# Patient Record
Sex: MALE | Race: WHITE | NOT HISPANIC OR LATINO | URBAN - METROPOLITAN AREA
[De-identification: names, ages, dates, MRNs, and addresses within clinical notes are randomized per-mention and may not be internally consistent; named-entity substitution may affect disease eponyms.]

---

## 2017-05-11 ENCOUNTER — APPOINTMENT (RX ONLY)
Dept: URBAN - METROPOLITAN AREA CLINIC 349 | Facility: CLINIC | Age: 27
Setting detail: DERMATOLOGY
End: 2017-05-11

## 2017-05-11 DIAGNOSIS — D22 MELANOCYTIC NEVI: ICD-10-CM

## 2017-05-11 PROBLEM — D22.9 MELANOCYTIC NEVI, UNSPECIFIED: Status: ACTIVE | Noted: 2017-05-11

## 2017-05-11 PROBLEM — D22.4 MELANOCYTIC NEVI OF SCALP AND NECK: Status: ACTIVE | Noted: 2017-05-11

## 2017-05-11 PROBLEM — D22.39 MELANOCYTIC NEVI OF OTHER PARTS OF FACE: Status: ACTIVE | Noted: 2017-05-11

## 2017-05-11 PROCEDURE — 11100: CPT

## 2017-05-11 PROCEDURE — ? COUNSELING

## 2017-05-11 PROCEDURE — A4550 SURGICAL TRAYS: HCPCS

## 2017-05-11 PROCEDURE — 88305 TISSUE EXAM BY PATHOLOGIST: CPT

## 2017-05-11 PROCEDURE — ? PATHOLOGY BILLING

## 2017-05-11 PROCEDURE — 99202 OFFICE O/P NEW SF 15 MIN: CPT | Mod: 25

## 2017-05-11 PROCEDURE — ? BIOPSY BY SHAVE METHOD

## 2017-05-11 ASSESSMENT — LOCATION SIMPLE DESCRIPTION DERM
LOCATION SIMPLE: NECK
LOCATION SIMPLE: NECK
LOCATION SIMPLE: RIGHT FOREHEAD

## 2017-05-11 ASSESSMENT — LOCATION ZONE DERM
LOCATION ZONE: FACE
LOCATION ZONE: NECK
LOCATION ZONE: NECK

## 2017-05-11 ASSESSMENT — LOCATION DETAILED DESCRIPTION DERM
LOCATION DETAILED: LEFT SUPERIOR POSTERIOR NECK
LOCATION DETAILED: RIGHT INFERIOR MEDIAL FOREHEAD
LOCATION DETAILED: LEFT SUPERIOR POSTERIOR NECK

## 2017-05-11 NOTE — PROCEDURE: BIOPSY BY SHAVE METHOD
Bill For Surgical Tray: yes
Consent: Written consent was obtained and risks were reviewed including but not limited to scarring, infection, bleeding, scabbing, incomplete removal, nerve damage and allergy to anesthesia.
Wound Care: Vaseline
Destruction After The Procedure: No
Biopsy Type: H and E
Notification Instructions: Patient will be notified of biopsy results. However, patient instructed to call the office if not contacted within 2 weeks.
Electrodesiccation And Curettage Text: The wound bed was treated with electrodesiccation and curettage after the biopsy was performed.
Silver Nitrate Text: The wound bed was treated with silver nitrate after the biopsy was performed.
Size Of Lesion In Cm: 0.6
Electrodesiccation Text: The wound bed was treated with electrodesiccation after the biopsy was performed.
Type Of Destruction Used: Electrodesiccation
Dressing: bandage
Anesthesia Volume In Cc (Will Not Render If 0): 0.4
Hemostasis: Drysol
X Size Of Lesion In Cm: 0.7
Additional Anesthesia Volume In Cc (Will Not Render If 0): 0
Billing Type: Third-Party Bill
Anesthesia Type: 1% lidocaine with epinephrine
Post-Care Instructions: I reviewed with the patient in detail post-care instructions. Patient is to keep the biopsy site dry overnight, and then apply bacitracin twice daily until healed. Patient may apply hydrogen peroxide soaks to remove any crusting.  After the procedure, the patient was observed for 5-10 minutes and was oriented to,person, place and time and denied feeling dizzy, queasy and stated that they were not going to faint
Biopsy Method: Dermablade
Accession #: [id number]
Cryotherapy Text: The wound bed was treated with cryotherapy after the biopsy was performed.
Detail Level: Detailed

## 2017-05-30 ENCOUNTER — APPOINTMENT (RX ONLY)
Dept: URBAN - METROPOLITAN AREA CLINIC 349 | Facility: CLINIC | Age: 27
Setting detail: DERMATOLOGY
End: 2017-05-30

## 2017-05-30 DIAGNOSIS — D22 MELANOCYTIC NEVI: ICD-10-CM

## 2017-05-30 PROBLEM — D22.62 MELANOCYTIC NEVI OF LEFT UPPER LIMB, INCLUDING SHOULDER: Status: ACTIVE | Noted: 2017-05-30

## 2017-05-30 PROBLEM — D22.39 MELANOCYTIC NEVI OF OTHER PARTS OF FACE: Status: ACTIVE | Noted: 2017-05-30

## 2017-05-30 PROCEDURE — 99213 OFFICE O/P EST LOW 20 MIN: CPT

## 2017-05-30 PROCEDURE — ? COUNSELING

## 2017-05-30 ASSESSMENT — LOCATION DETAILED DESCRIPTION DERM
LOCATION DETAILED: INFERIOR MID FOREHEAD
LOCATION DETAILED: LEFT ANTERIOR PROXIMAL UPPER ARM

## 2017-05-30 ASSESSMENT — LOCATION ZONE DERM
LOCATION ZONE: FACE
LOCATION ZONE: ARM

## 2017-05-30 ASSESSMENT — LOCATION SIMPLE DESCRIPTION DERM
LOCATION SIMPLE: INFERIOR FOREHEAD
LOCATION SIMPLE: LEFT UPPER ARM

## 2017-06-27 ENCOUNTER — APPOINTMENT (RX ONLY)
Dept: URBAN - METROPOLITAN AREA CLINIC 349 | Facility: CLINIC | Age: 27
Setting detail: DERMATOLOGY
End: 2017-06-27

## 2017-06-27 DIAGNOSIS — D22 MELANOCYTIC NEVI: ICD-10-CM

## 2017-06-27 PROBLEM — D22.39 MELANOCYTIC NEVI OF OTHER PARTS OF FACE: Status: ACTIVE | Noted: 2017-06-27

## 2017-06-27 PROBLEM — D22.9 MELANOCYTIC NEVI, UNSPECIFIED: Status: ACTIVE | Noted: 2017-06-27

## 2017-06-27 PROCEDURE — ? PATHOLOGY BILLING

## 2017-06-27 PROCEDURE — 99213 OFFICE O/P EST LOW 20 MIN: CPT | Mod: 25

## 2017-06-27 PROCEDURE — 88305 TISSUE EXAM BY PATHOLOGIST: CPT

## 2017-06-27 PROCEDURE — A4550 SURGICAL TRAYS: HCPCS

## 2017-06-27 PROCEDURE — ? COUNSELING

## 2017-06-27 PROCEDURE — ? SHAVE REMOVAL

## 2017-06-27 PROCEDURE — 11311 SHAVE SKIN LESION 0.6-1.0 CM: CPT

## 2017-06-27 ASSESSMENT — LOCATION SIMPLE DESCRIPTION DERM
LOCATION SIMPLE: RIGHT CHEEK
LOCATION SIMPLE: INFERIOR FOREHEAD
LOCATION SIMPLE: LEFT CHEEK

## 2017-06-27 ASSESSMENT — LOCATION DETAILED DESCRIPTION DERM
LOCATION DETAILED: INFERIOR MID FOREHEAD
LOCATION DETAILED: LEFT CENTRAL MANDIBULAR CHEEK
LOCATION DETAILED: RIGHT CENTRAL MALAR CHEEK

## 2017-06-27 ASSESSMENT — LOCATION ZONE DERM: LOCATION ZONE: FACE

## 2017-08-22 ENCOUNTER — APPOINTMENT (RX ONLY)
Dept: URBAN - METROPOLITAN AREA CLINIC 349 | Facility: CLINIC | Age: 27
Setting detail: DERMATOLOGY
End: 2017-08-22

## 2017-08-22 DIAGNOSIS — L90.5 SCAR CONDITIONS AND FIBROSIS OF SKIN: ICD-10-CM

## 2017-08-22 PROCEDURE — ? COUNSELING

## 2017-08-22 PROCEDURE — 99213 OFFICE O/P EST LOW 20 MIN: CPT

## 2017-08-22 ASSESSMENT — LOCATION SIMPLE DESCRIPTION DERM: LOCATION SIMPLE: LEFT FOREHEAD

## 2017-08-22 ASSESSMENT — LOCATION ZONE DERM: LOCATION ZONE: FACE

## 2017-08-22 ASSESSMENT — LOCATION DETAILED DESCRIPTION DERM: LOCATION DETAILED: LEFT MEDIAL FOREHEAD

## 2018-07-05 ENCOUNTER — HOSPITAL ENCOUNTER (EMERGENCY)
Age: 28
Discharge: HOME | End: 2018-07-06
Attending: EMERGENCY MEDICINE
Payer: COMMERCIAL

## 2018-07-05 DIAGNOSIS — R10.11 ABDOMINAL PAIN, RIGHT UPPER QUADRANT: Primary | ICD-10-CM

## 2018-07-05 LAB
ALBUMIN SERPL-MCNC: 4.8 G/DL (ref 3.5–5)
ALBUMIN/GLOB SERPL: 1.4 {RATIO} (ref 1.2–3.5)
ALP SERPL-CCNC: 63 U/L (ref 50–136)
ALT SERPL-CCNC: 27 U/L (ref 12–65)
ANION GAP SERPL CALC-SCNC: 8 MMOL/L (ref 7–16)
APPEARANCE UR: CLEAR
AST SERPL-CCNC: 25 U/L (ref 15–37)
BASOPHILS # BLD: 0.1 K/UL (ref 0–0.2)
BASOPHILS NFR BLD: 0 % (ref 0–2)
BILIRUB SERPL-MCNC: 0.6 MG/DL (ref 0.2–1.1)
BILIRUB UR QL: NEGATIVE
BUN SERPL-MCNC: 14 MG/DL (ref 6–23)
CALCIUM SERPL-MCNC: 9.7 MG/DL (ref 8.3–10.4)
CHLORIDE SERPL-SCNC: 104 MMOL/L (ref 98–107)
CO2 SERPL-SCNC: 30 MMOL/L (ref 21–32)
COLOR UR: YELLOW
CREAT SERPL-MCNC: 1.18 MG/DL (ref 0.8–1.5)
DIFFERENTIAL METHOD BLD: ABNORMAL
EOSINOPHIL # BLD: 0.4 K/UL (ref 0–0.8)
EOSINOPHIL NFR BLD: 3 % (ref 0.5–7.8)
ERYTHROCYTE [DISTWIDTH] IN BLOOD BY AUTOMATED COUNT: 12.4 % (ref 11.9–14.6)
GLOBULIN SER CALC-MCNC: 3.5 G/DL (ref 2.3–3.5)
GLUCOSE SERPL-MCNC: 103 MG/DL (ref 65–100)
GLUCOSE UR STRIP.AUTO-MCNC: NEGATIVE MG/DL
HCT VFR BLD AUTO: 45.3 % (ref 41.1–50.3)
HGB BLD-MCNC: 15.6 G/DL (ref 13.6–17.2)
HGB UR QL STRIP: NEGATIVE
IMM GRANULOCYTES # BLD: 0 K/UL (ref 0–0.5)
IMM GRANULOCYTES NFR BLD AUTO: 0 % (ref 0–5)
KETONES UR QL STRIP.AUTO: NEGATIVE MG/DL
LEUKOCYTE ESTERASE UR QL STRIP.AUTO: NEGATIVE
LIPASE SERPL-CCNC: 109 U/L (ref 73–393)
LYMPHOCYTES # BLD: 1.8 K/UL (ref 0.5–4.6)
LYMPHOCYTES NFR BLD: 14 % (ref 13–44)
MCH RBC QN AUTO: 32.5 PG (ref 26.1–32.9)
MCHC RBC AUTO-ENTMCNC: 34.4 G/DL (ref 31.4–35)
MCV RBC AUTO: 94.4 FL (ref 79.6–97.8)
MONOCYTES # BLD: 0.7 K/UL (ref 0.1–1.3)
MONOCYTES NFR BLD: 6 % (ref 4–12)
NEUTS SEG # BLD: 10 K/UL (ref 1.7–8.2)
NEUTS SEG NFR BLD: 77 % (ref 43–78)
NITRITE UR QL STRIP.AUTO: NEGATIVE
PH UR STRIP: 6.5 [PH] (ref 5–9)
PLATELET # BLD AUTO: 312 K/UL (ref 150–450)
PMV BLD AUTO: 9.3 FL (ref 10.8–14.1)
POTASSIUM SERPL-SCNC: 4 MMOL/L (ref 3.5–5.1)
PROT SERPL-MCNC: 8.3 G/DL (ref 6.3–8.2)
PROT UR STRIP-MCNC: NEGATIVE MG/DL
RBC # BLD AUTO: 4.8 M/UL (ref 4.23–5.67)
SODIUM SERPL-SCNC: 142 MMOL/L (ref 136–145)
SP GR UR REFRACTOMETRY: 1.02 (ref 1–1.02)
UROBILINOGEN UR QL STRIP.AUTO: 0.2 EU/DL (ref 0.2–1)
WBC # BLD AUTO: 13 K/UL (ref 4.3–11.1)

## 2018-07-05 PROCEDURE — 81003 URINALYSIS AUTO W/O SCOPE: CPT | Performed by: EMERGENCY MEDICINE

## 2018-07-05 PROCEDURE — 96361 HYDRATE IV INFUSION ADD-ON: CPT | Performed by: EMERGENCY MEDICINE

## 2018-07-05 PROCEDURE — 99284 EMERGENCY DEPT VISIT MOD MDM: CPT | Performed by: EMERGENCY MEDICINE

## 2018-07-05 PROCEDURE — 85025 COMPLETE CBC W/AUTO DIFF WBC: CPT | Performed by: EMERGENCY MEDICINE

## 2018-07-05 PROCEDURE — 96374 THER/PROPH/DIAG INJ IV PUSH: CPT | Performed by: EMERGENCY MEDICINE

## 2018-07-05 PROCEDURE — 96375 TX/PRO/DX INJ NEW DRUG ADDON: CPT | Performed by: EMERGENCY MEDICINE

## 2018-07-05 PROCEDURE — 83690 ASSAY OF LIPASE: CPT | Performed by: EMERGENCY MEDICINE

## 2018-07-05 PROCEDURE — 74011250636 HC RX REV CODE- 250/636: Performed by: EMERGENCY MEDICINE

## 2018-07-05 PROCEDURE — 80053 COMPREHEN METABOLIC PANEL: CPT | Performed by: EMERGENCY MEDICINE

## 2018-07-05 RX ORDER — KETOROLAC TROMETHAMINE 30 MG/ML
15 INJECTION, SOLUTION INTRAMUSCULAR; INTRAVENOUS
Status: COMPLETED | OUTPATIENT
Start: 2018-07-05 | End: 2018-07-05

## 2018-07-05 RX ORDER — ONDANSETRON 2 MG/ML
4 INJECTION INTRAMUSCULAR; INTRAVENOUS
Status: COMPLETED | OUTPATIENT
Start: 2018-07-05 | End: 2018-07-05

## 2018-07-05 RX ORDER — RANITIDINE 150 MG/1
150 TABLET, FILM COATED ORAL 2 TIMES DAILY
COMMUNITY
End: 2020-08-13

## 2018-07-05 RX ADMIN — ONDANSETRON 4 MG: 2 INJECTION INTRAMUSCULAR; INTRAVENOUS at 23:20

## 2018-07-05 RX ADMIN — KETOROLAC TROMETHAMINE 15 MG: 30 INJECTION, SOLUTION INTRAMUSCULAR at 23:20

## 2018-07-05 RX ADMIN — SODIUM CHLORIDE 1000 ML: 900 INJECTION, SOLUTION INTRAVENOUS at 23:19

## 2018-07-06 ENCOUNTER — APPOINTMENT (OUTPATIENT)
Dept: ULTRASOUND IMAGING | Age: 28
End: 2018-07-06
Attending: EMERGENCY MEDICINE
Payer: COMMERCIAL

## 2018-07-06 VITALS
HEIGHT: 68 IN | SYSTOLIC BLOOD PRESSURE: 138 MMHG | WEIGHT: 179.9 LBS | DIASTOLIC BLOOD PRESSURE: 79 MMHG | HEART RATE: 67 BPM | TEMPERATURE: 98.4 F | RESPIRATION RATE: 16 BRPM | OXYGEN SATURATION: 100 % | BODY MASS INDEX: 27.26 KG/M2

## 2018-07-06 RX ORDER — HYOSCYAMINE SULFATE 0.12 MG/1
0.12 TABLET SUBLINGUAL
Status: DISCONTINUED | OUTPATIENT
Start: 2018-07-06 | End: 2018-07-06 | Stop reason: HOSPADM

## 2018-07-06 RX ORDER — KETOROLAC TROMETHAMINE 30 MG/ML
15 INJECTION, SOLUTION INTRAMUSCULAR; INTRAVENOUS
Status: DISCONTINUED | OUTPATIENT
Start: 2018-07-06 | End: 2018-07-06 | Stop reason: HOSPADM

## 2018-07-06 RX ORDER — DIPHENHYDRAMINE HYDROCHLORIDE 50 MG/ML
12.5 INJECTION, SOLUTION INTRAMUSCULAR; INTRAVENOUS ONCE
Status: DISCONTINUED | OUTPATIENT
Start: 2018-07-06 | End: 2018-07-06 | Stop reason: HOSPADM

## 2018-07-06 NOTE — ED NOTES
I have reviewed discharge instructions with the patient. The patient verbalized understanding. Patient left ED via Discharge Method: ambulatory to Home with (female visitor). Opportunity for questions and clarification provided. Patient given 0 scripts. To continue your aftercare when you leave the hospital, you may receive an automated call from our care team to check in on how you are doing. This is a free service and part of our promise to provide the best care and service to meet your aftercare needs.  If you have questions, or wish to unsubscribe from this service please call [phone]. Thank you for Choosing our [facility].

## 2018-07-06 NOTE — ED PROVIDER NOTES
HPI Comments: Here with 5 days of recurring abdominal pain that is worse after eating. No history of GI issues. No history of GB or PUD. No diarrhea.stopped all meds today. Some bloating. Poorly localized at times but mainly RUQ and really minimal to lower. . Vomiting today. Slight back pain. Only meds are Chantix and Zantac. Earlier some lower back pain. No fever. Appointment in AM with PMD @1130    Patient is a 25 y.o. male presenting with abdominal pain. The history is provided by the patient and the spouse. Abdominal Pain    This is a new problem. Episode onset: 5 days. The pain is associated with vomiting and eating. The pain is located in the RUQ. The pain is moderate. Associated symptoms include vomiting. Pertinent negatives include no fever, no diarrhea, no hematochezia, no melena, no constipation, no dysuria, no frequency and no hematuria. Nothing worsens the pain. The pain is relieved by nothing. Past workup includes no CT scan, no ultrasound. His past medical history does not include PUD, gallstones, GERD, ulcerative colitis, Crohn's disease, irritable bowel syndrome, UTI, pancreatitis, diverticulitis or DM. none       Past Medical History:   Diagnosis Date    Depression     well controlled with med    Foreign body in upper extremity     heroine needle embedded in arm    IV drug abuse     heroine    Opiate dependence ([facility] [address]        History reviewed. No pertinent surgical history. Family History:   Problem Relation Age of Onset    Diabetes Mother        Social History     Social History    Marital status: SINGLE     Spouse name: N/A    Number of children: N/A    Years of education: N/A     Occupational History    Not on file.      Social History Main Topics    Smoking status: Current Every Day Smoker     Packs/day: 0.50     Years: 5.00    Smokeless tobacco: Never Used    Alcohol use No    Drug use: Yes     Special: Opiates      Comment: heroine    Sexual activity: Not on file     Other Topics Concern    Not on file     Social History Narrative         ALLERGIES: Review of patient's allergies indicates no known allergies. Review of Systems   Constitutional: Negative for fever. Respiratory: Negative. Cardiovascular: Negative. Gastrointestinal: Positive for abdominal pain and vomiting. Negative for anal bleeding, blood in stool, constipation, diarrhea, hematochezia and melena. Genitourinary: Negative for dysuria, frequency and hematuria. Musculoskeletal: Negative. Skin: Negative. All other systems reviewed and are negative. Vitals:    07/05/18 2200 07/05/18 2230 07/05/18 2330 07/06/18 0000   BP: 124/68 119/73 121/75 138/79   Pulse: 68 65 73 67   Resp: 16 20 16 16   Temp:       SpO2: 97% 98% 100% 100%   Weight:       Height:                Physical Exam   Constitutional: He appears well-developed and well-nourished. No distress. HENT:   Head: Atraumatic. Mouth/Throat: Oropharynx is clear and moist.   Eyes: Conjunctivae are normal. No scleral icterus. Neck: Neck supple. Cardiovascular: Normal rate and intact distal pulses. Pulmonary/Chest: Effort normal. No respiratory distress. He has no wheezes. Abdominal: Bowel sounds are normal. He exhibits no distension and no mass. There is no splenomegaly. There is no rigidity, no rebound, no guarding, no CVA tenderness and no tenderness at McBurney's point. No hernia. Negative Jo's  No peritoneal signs  No CVAT   Musculoskeletal: Normal range of motion. He exhibits no edema. Neurological: He is alert. He exhibits normal muscle tone. Coordination normal.   Skin: Skin is warm and dry. He is not diaphoretic. No erythema. Psychiatric: His behavior is normal. Thought content normal.   Nursing note and vitals reviewed.        MDM  Number of Diagnoses or Management Options  Abdominal pain, right upper quadrant:      Amount and/or Complexity of Data Reviewed  Clinical lab tests: reviewed and ordered  Tests in the radiology section of CPT®: ordered  Decide to obtain previous medical records or to obtain history from someone other than the patient: yes (Reviewed old records)    Risk of Complications, Morbidity, and/or Mortality  Presenting problems: moderate  Diagnostic procedures: low  Management options: moderate  General comments: Refused to stay for ultrasound due to delays  Plan to follow with PMD in AM    Patient Progress  Patient progress: stable        ED Course       Procedures  Recent Results (from the past 12 hour(s))   URINALYSIS W/ RFLX MICROSCOPIC    Collection Time: 07/05/18  7:59 PM   Result Value Ref Range    Color YELLOW      Appearance CLEAR      Specific gravity 1.025 (H) 1.001 - 1.023      pH (UA) 6.5 5.0 - 9.0      Protein NEGATIVE  NEG mg/dL    Glucose NEGATIVE  mg/dL    Ketone NEGATIVE  NEG mg/dL    Bilirubin NEGATIVE  NEG      Blood NEGATIVE  NEG      Urobilinogen 0.2 0.2 - 1.0 EU/dL    Nitrites NEGATIVE  NEG      Leukocyte Esterase NEGATIVE  NEG     CBC WITH AUTOMATED DIFF    Collection Time: 07/05/18  8:13 PM   Result Value Ref Range    WBC 13.0 (H) 4.3 - 11.1 K/uL    RBC 4.80 4.23 - 5.67 M/uL    HGB 15.6 13.6 - 17.2 g/dL    HCT 45.3 41.1 - 50.3 %    MCV 94.4 79.6 - 97.8 FL    MCH 32.5 26.1 - 32.9 PG    MCHC 34.4 31.4 - 35.0 g/dL    RDW 12.4 11.9 - 14.6 %    PLATELET 528 926 - 133 K/uL    MPV 9.3 (L) 10.8 - 14.1 FL    DF AUTOMATED      NEUTROPHILS 77 43 - 78 %    LYMPHOCYTES 14 13 - 44 %    MONOCYTES 6 4.0 - 12.0 %    EOSINOPHILS 3 0.5 - 7.8 %    BASOPHILS 0 0.0 - 2.0 %    IMMATURE GRANULOCYTES 0 0.0 - 5.0 %    ABS. NEUTROPHILS 10.0 (H) 1.7 - 8.2 K/UL    ABS. LYMPHOCYTES 1.8 0.5 - 4.6 K/UL    ABS. MONOCYTES 0.7 0.1 - 1.3 K/UL    ABS. EOSINOPHILS 0.4 0.0 - 0.8 K/UL    ABS. BASOPHILS 0.1 0.0 - 0.2 K/UL    ABS. IMM.  GRANS. 0.0 0.0 - 0.5 K/UL   METABOLIC PANEL, COMPREHENSIVE    Collection Time: 07/05/18  8:13 PM   Result Value Ref Range    Sodium 142 136 - 145 mmol/L    Potassium 4.0 3.5 - 5.1 mmol/L Chloride 104 98 - 107 mmol/L    CO2 30 21 - 32 mmol/L    Anion gap 8 7 - 16 mmol/L    Glucose 103 (H) 65 - 100 mg/dL    BUN 14 6 - 23 MG/DL    Creatinine 1.18 0.8 - 1.5 MG/DL    GFR est AA >60 >60 ml/min/1.73m2    GFR est non-AA >60 >60 ml/min/1.73m2    Calcium 9.7 8.3 - 10.4 MG/DL    Bilirubin, total 0.6 0.2 - 1.1 MG/DL    ALT (SGPT) 27 12 - 65 U/L    AST (SGOT) 25 15 - 37 U/L    Alk.  phosphatase 63 50 - 136 U/L    Protein, total 8.3 (H) 6.3 - 8.2 g/dL    Albumin 4.8 3.5 - 5.0 g/dL    Globulin 3.5 2.3 - 3.5 g/dL    A-G Ratio 1.4 1.2 - 3.5     LIPASE    Collection Time: 07/05/18  8:13 PM   Result Value Ref Range    Lipase 109 73 - 393 U/L

## 2018-07-06 NOTE — DISCHARGE INSTRUCTIONS
Abdominal Pain: Care Instructions  Your Care Instructions    Abdominal pain has many possible causes. Some aren't serious and get better on their own in a few days. Others need more testing and treatment. If your pain continues or gets worse, you need to be rechecked and may need more tests to find out what is wrong. You may need surgery to correct the problem. Don't ignore new symptoms, such as fever, nausea and vomiting, urination problems, pain that gets worse, and dizziness. These may be signs of a more serious problem. Your doctor may have recommended a follow-up visit in the next 8 to 12 hours. If you are not getting better, you may need more tests or treatment. The doctor has checked you carefully, but problems can develop later. If you notice any problems or new symptoms, get medical treatment right away. Follow-up care is a key part of your treatment and safety. Be sure to make and go to all appointments, and call your doctor if you are having problems. It's also a good idea to know your test results and keep a list of the medicines you take. How can you care for yourself at home? · Rest until you feel better. · To prevent dehydration, drink plenty of fluids, enough so that your urine is light yellow or clear like water. Choose water and other caffeine-free clear liquids until you feel better. If you have kidney, heart, or liver disease and have to limit fluids, talk with your doctor before you increase the amount of fluids you drink. · If your stomach is upset, eat mild foods, such as rice, dry toast or crackers, bananas, and applesauce. Try eating several small meals instead of two or three large ones. · Wait until 48 hours after all symptoms have gone away before you have spicy foods, alcohol, and drinks that contain caffeine. · Do not eat foods that are high in fat. · Avoid anti-inflammatory medicines such as aspirin, ibuprofen (Advil, Motrin), and naproxen (Aleve).  These can cause stomach upset. Talk to your doctor if you take daily aspirin for another health problem. When should you call for help? Call 911 anytime you think you may need emergency care. For example, call if:  ? · You passed out (lost consciousness). ? · You pass maroon or very bloody stools. ? · You vomit blood or what looks like coffee grounds. ? · You have new, severe belly pain. ?Call your doctor now or seek immediate medical care if:  ? · Your pain gets worse, especially if it becomes focused in one area of your belly. ? · You have a new or higher fever. ? · Your stools are black and look like tar, or they have streaks of blood. ? · You have unexpected vaginal bleeding. ? · You have symptoms of a urinary tract infection. These may include:  ¨ Pain when you urinate. ¨ Urinating more often than usual.  ¨ Blood in your urine. ? · You are dizzy or lightheaded, or you feel like you may faint. ? Watch closely for changes in your health, and be sure to contact your doctor if:  ? · You are not getting better after 1 day (24 hours). Where can you learn more? Go to http://shailesh-karie.info/. Enter I112 in the search box to learn more about \"Abdominal Pain: Care Instructions. \"  Current as of: March 20, 2017  Content Version: 11.4  © 5336-4257 [employer]. Care instructions adapted under license by [employer] (which disclaims liability or warranty for this information). If you have questions about a medical condition or this instruction, always ask your healthcare professional. [place] [address] any warranty or liability for your use of this information.

## 2018-07-07 NOTE — ED NOTES
When told patient needed to check chart \" to see if meds might need to avoid\". Nurse stated that patient needed to see me immediately. This regarding patient after left to check chart told female with him of prior drug issue - of note I did not mention this beyond going to check meds as mentioned above. Patient does not wish PMD to know this so have referred to ER follow up so as not to have chart that has this issue documented historically

## 2020-08-13 PROBLEM — R10.13 ACUTE EPIGASTRIC PAIN: Status: ACTIVE | Noted: 2018-07-10

## 2020-08-13 PROBLEM — R68.89 FLU-LIKE SYMPTOMS: Status: ACTIVE | Noted: 2017-12-24

## 2020-08-13 PROBLEM — L05.01 PILONIDAL ABSCESS: Status: ACTIVE | Noted: 2018-02-16

## 2020-08-13 PROBLEM — R11.2 NAUSEA AND VOMITING: Status: ACTIVE | Noted: 2018-07-10

## 2020-08-13 PROBLEM — R14.0 BLOATING: Status: ACTIVE | Noted: 2018-07-10

## 2020-08-13 PROBLEM — R93.89 ABNORMAL ULTRASOUND: Status: ACTIVE | Noted: 2018-07-10

## 2022-03-18 PROBLEM — R11.2 NAUSEA AND VOMITING: Status: ACTIVE | Noted: 2018-07-10

## 2022-03-18 PROBLEM — R14.0 BLOATING: Status: ACTIVE | Noted: 2018-07-10

## 2022-03-18 PROBLEM — R93.89 ABNORMAL ULTRASOUND: Status: ACTIVE | Noted: 2018-07-10

## 2022-03-19 PROBLEM — L05.01 PILONIDAL ABSCESS: Status: ACTIVE | Noted: 2018-02-16

## 2022-03-19 PROBLEM — R10.13 ACUTE EPIGASTRIC PAIN: Status: ACTIVE | Noted: 2018-07-10

## 2022-03-19 PROBLEM — R68.89 FLU-LIKE SYMPTOMS: Status: ACTIVE | Noted: 2017-12-24

## 2022-10-12 ENCOUNTER — OFFICE VISIT (OUTPATIENT)
Dept: INTERNAL MEDICINE CLINIC | Facility: CLINIC | Age: 32
End: 2022-10-12
Payer: COMMERCIAL

## 2022-10-12 VITALS
SYSTOLIC BLOOD PRESSURE: 132 MMHG | HEART RATE: 85 BPM | TEMPERATURE: 98.1 F | WEIGHT: 203 LBS | HEIGHT: 68 IN | DIASTOLIC BLOOD PRESSURE: 73 MMHG | BODY MASS INDEX: 30.77 KG/M2 | OXYGEN SATURATION: 94 %

## 2022-10-12 DIAGNOSIS — F11.10 HEROIN ABUSE (HCC): ICD-10-CM

## 2022-10-12 DIAGNOSIS — F15.10: ICD-10-CM

## 2022-10-12 DIAGNOSIS — Z11.59 NEED FOR HEPATITIS C SCREENING TEST: ICD-10-CM

## 2022-10-12 DIAGNOSIS — E66.9 CLASS 1 OBESITY WITHOUT SERIOUS COMORBIDITY WITH BODY MASS INDEX (BMI) OF 30.0 TO 30.9 IN ADULT, UNSPECIFIED OBESITY TYPE: ICD-10-CM

## 2022-10-12 DIAGNOSIS — Z11.4 ENCOUNTER FOR SCREENING FOR HIV: ICD-10-CM

## 2022-10-12 DIAGNOSIS — Z00.00 ANNUAL VISIT FOR GENERAL ADULT MEDICAL EXAMINATION WITHOUT ABNORMAL FINDINGS: ICD-10-CM

## 2022-10-12 DIAGNOSIS — S61.402A OPEN WOUND OF LEFT HAND WITHOUT FOREIGN BODY, UNSPECIFIED WOUND TYPE, INITIAL ENCOUNTER: ICD-10-CM

## 2022-10-12 DIAGNOSIS — Z72.0 TOBACCO ABUSE: ICD-10-CM

## 2022-10-12 DIAGNOSIS — F15.10: Primary | ICD-10-CM

## 2022-10-12 PROBLEM — R93.89 ABNORMAL ULTRASOUND: Status: RESOLVED | Noted: 2018-07-10 | Resolved: 2022-10-12

## 2022-10-12 PROBLEM — R68.89 FLU-LIKE SYMPTOMS: Status: RESOLVED | Noted: 2017-12-24 | Resolved: 2022-10-12

## 2022-10-12 PROBLEM — R11.2 NAUSEA AND VOMITING: Status: RESOLVED | Noted: 2018-07-10 | Resolved: 2022-10-12

## 2022-10-12 PROBLEM — R10.13 ACUTE EPIGASTRIC PAIN: Status: RESOLVED | Noted: 2018-07-10 | Resolved: 2022-10-12

## 2022-10-12 PROBLEM — L05.01 PILONIDAL ABSCESS: Status: RESOLVED | Noted: 2018-02-16 | Resolved: 2022-10-12

## 2022-10-12 PROBLEM — R14.0 BLOATING: Status: RESOLVED | Noted: 2018-07-10 | Resolved: 2022-10-12

## 2022-10-12 LAB
ANION GAP SERPL CALC-SCNC: 4 MMOL/L (ref 2–11)
BASOPHILS # BLD: 0 K/UL (ref 0–0.2)
BASOPHILS NFR BLD: 1 % (ref 0–2)
BUN SERPL-MCNC: 14 MG/DL (ref 6–23)
CALCIUM SERPL-MCNC: 9.4 MG/DL (ref 8.3–10.4)
CHLORIDE SERPL-SCNC: 103 MMOL/L (ref 101–110)
CHOLEST SERPL-MCNC: 135 MG/DL
CO2 SERPL-SCNC: 30 MMOL/L (ref 21–32)
CREAT SERPL-MCNC: 1 MG/DL (ref 0.8–1.5)
DIFFERENTIAL METHOD BLD: ABNORMAL
EOSINOPHIL # BLD: 0.1 K/UL (ref 0–0.8)
EOSINOPHIL NFR BLD: 3 % (ref 0.5–7.8)
ERYTHROCYTE [DISTWIDTH] IN BLOOD BY AUTOMATED COUNT: 13.6 % (ref 11.9–14.6)
GLUCOSE SERPL-MCNC: 111 MG/DL (ref 65–100)
HCT VFR BLD AUTO: 62.7 % (ref 41.1–50.3)
HDLC SERPL-MCNC: 31 MG/DL (ref 40–60)
HDLC SERPL: 4.4 {RATIO}
HGB BLD-MCNC: 20.4 G/DL (ref 13.6–17.2)
HIV 1+2 AB+HIV1 P24 AG SERPL QL IA: NONREACTIVE
HIV 1/2 RESULT COMMENT: NORMAL
IMM GRANULOCYTES # BLD AUTO: 0 K/UL (ref 0–0.5)
IMM GRANULOCYTES NFR BLD AUTO: 0 % (ref 0–5)
LDLC SERPL CALC-MCNC: 57.2 MG/DL
LYMPHOCYTES # BLD: 1.6 K/UL (ref 0.5–4.6)
LYMPHOCYTES NFR BLD: 47 % (ref 13–44)
MCH RBC QN AUTO: 29.8 PG (ref 26.1–32.9)
MCHC RBC AUTO-ENTMCNC: 32.5 G/DL (ref 31.4–35)
MCV RBC AUTO: 91.5 FL (ref 82–102)
MONOCYTES # BLD: 0.2 K/UL (ref 0.1–1.3)
MONOCYTES NFR BLD: 5 % (ref 4–12)
NEUTS SEG # BLD: 1.6 K/UL (ref 1.7–8.2)
NEUTS SEG NFR BLD: 45 % (ref 43–78)
NRBC # BLD: 0 K/UL (ref 0–0.2)
PLATELET # BLD AUTO: 86 K/UL (ref 150–450)
PMV BLD AUTO: 9.5 FL (ref 9.4–12.3)
POTASSIUM SERPL-SCNC: 4.3 MMOL/L (ref 3.5–5.1)
RBC # BLD AUTO: 6.85 M/UL (ref 4.23–5.6)
SODIUM SERPL-SCNC: 137 MMOL/L (ref 133–143)
TRIGL SERPL-MCNC: 234 MG/DL (ref 35–150)
VLDLC SERPL CALC-MCNC: 46.8 MG/DL (ref 6–23)
WBC # BLD AUTO: 3.5 K/UL (ref 4.3–11.1)

## 2022-10-12 PROCEDURE — 99204 OFFICE O/P NEW MOD 45 MIN: CPT | Performed by: INTERNAL MEDICINE

## 2022-10-12 RX ORDER — NICOTINE 21 MG/24HR
1 PATCH, TRANSDERMAL 24 HOURS TRANSDERMAL DAILY
Qty: 30 PATCH | Refills: 3 | Status: SHIPPED | OUTPATIENT
Start: 2022-10-12 | End: 2023-02-09

## 2022-10-12 RX ORDER — VARENICLINE TARTRATE 1 MG/1
1 TABLET, FILM COATED ORAL 2 TIMES DAILY
Qty: 180 TABLET | Refills: 1 | Status: SHIPPED | OUTPATIENT
Start: 2022-10-12 | End: 2023-04-10

## 2022-10-12 ASSESSMENT — ANXIETY QUESTIONNAIRES
IF YOU CHECKED OFF ANY PROBLEMS ON THIS QUESTIONNAIRE, HOW DIFFICULT HAVE THESE PROBLEMS MADE IT FOR YOU TO DO YOUR WORK, TAKE CARE OF THINGS AT HOME, OR GET ALONG WITH OTHER PEOPLE: NOT DIFFICULT AT ALL
GAD7 TOTAL SCORE: 0
7. FEELING AFRAID AS IF SOMETHING AWFUL MIGHT HAPPEN: 0
4. TROUBLE RELAXING: 0
2. NOT BEING ABLE TO STOP OR CONTROL WORRYING: 0
5. BEING SO RESTLESS THAT IT IS HARD TO SIT STILL: 0
3. WORRYING TOO MUCH ABOUT DIFFERENT THINGS: 0
1. FEELING NERVOUS, ANXIOUS, OR ON EDGE: 0
6. BECOMING EASILY ANNOYED OR IRRITABLE: 0

## 2022-10-12 ASSESSMENT — PATIENT HEALTH QUESTIONNAIRE - PHQ9
SUM OF ALL RESPONSES TO PHQ QUESTIONS 1-9: 0
1. LITTLE INTEREST OR PLEASURE IN DOING THINGS: 0
2. FEELING DOWN, DEPRESSED OR HOPELESS: 0
SUM OF ALL RESPONSES TO PHQ QUESTIONS 1-9: 0
SUM OF ALL RESPONSES TO PHQ9 QUESTIONS 1 & 2: 0

## 2022-10-12 ASSESSMENT — ENCOUNTER SYMPTOMS
GASTROINTESTINAL NEGATIVE: 1
RESPIRATORY NEGATIVE: 1
EYES NEGATIVE: 1

## 2022-10-12 NOTE — PROGRESS NOTES
Moses Nichols D.O. [facility]  [address], [place] [address]  Tel: [phone]    History and Physical Office Visit     Patient Name: Maya Roberto    :  1990   MRN:   [id number]      Today's Date: 10/12/22 10:29 AM    Subjective     The patient is a 27y.o. year old male with a pmh as listed below. The patient presents today to establish care. He denies any significant medical history. He is not on any medications. Family history of cancer: NONE  Family history of heart disease/early onset MI: Father has high blood pressure. Mother has T2DM. Diet: He states he does not eat very healthy. He eats a lot of fast food. Exercise: He does not exercise. Alcohol: NONE  Cigarettes: He smokes 3/4 a pack a day. He has been smoking for 4-5 years. He has quit before with Chantix. He is interested in quitting smoking. He does METH and Heroin daily. He will shoot, sniff, or smoke. He is also interested in quitting this. 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 Safety plan:  His parents are aware of his addictions and support him in quitting. He denies any suicidal homicidal ideations. He denies any history of depression. He does live with his parents and feels safe at home. He does have a good support system in the area. He is motivated to quit. New complaints:  He states he is interested in quitting smoking and ALL DRUGS. He has been in rehab before. Review of Systems   Constitutional: Negative. HENT: Negative. Eyes: Negative. Respiratory: Negative. Cardiovascular: Negative. Gastrointestinal: Negative. Genitourinary: Negative. Musculoskeletal: Negative. Skin: Negative. Neurological: Negative. Hematological: Negative.     Psychiatric/Behavioral:          Substance abuse      Past Medical History:   Diagnosis Date    Abnormal ultrasound 7/10/2018    Last Assessment & Plan:  Pt found to have a fatty liver and mildly enlarged spleen. Will proceed  with bloodwork. Acute epigastric pain 7/10/2018    Last Assessment & Plan:  Stable. Proceed with EGD to assess for any significant upper GI mucosal  lesions. , Will be evaluating for any esophagitis, gastritis, duodenitis,  H. Pylori, and PUD. Bloating 7/10/2018    Added automatically from request for surgery [id number]     Depression     well controlled with med    Flu-like symptoms 2017    Foreign body in upper extremity     heroine needle embedded in arm    IV drug abuse ([facility] [address]     heroine    Nausea and vomiting 7/10/2018    Last Assessment & Plan:  Improved.  Proceed with EGD     Opiate dependence ([facility] [address]     Pilonidal abscess 2018    Overview:  Added automatically from request for surgery [id number]      Social History     Socioeconomic History    Marital status: Single     Spouse name: Not on file    Number of children: Not on file    Years of education: Not on file    Highest education level: Not on file   Occupational History    Not on file   Tobacco Use    Smoking status: Every Day     Packs/day: 0.50     Types: Cigarettes     Last attempt to quit: 2013     Years since quittin.7    Smokeless tobacco: Never   Substance and Sexual Activity    Alcohol use: No    Drug use: Yes     Types: Opiates , Methamphetamines (Crystal Meth)     Comment: herion,    Sexual activity: Not on file   Other Topics Concern    Not on file   Social History Narrative    Not on file     Social Determinants of Health     Financial Resource Strain: Not on file   Food Insecurity: Not on file   Transportation Needs: Not on file   Physical Activity: Not on file   Stress: Not on file   Social Connections: Not on file   Intimate Partner Violence: Not on file   Housing Stability: Not on file         Current Outpatient Medications   Medication Sig    mupirocin (BACTROBAN) 2 % ointment Apply topically daily (Patient not taking: Reported on 10/12/2022)     No current facility-administered medications for this visit. Objective     Vitals:    10/12/22 0958   BP: 132/73   Pulse: 85   Temp: 98.1 °F (36.7 °C)   TempSrc: Temporal   SpO2: 94%   Weight: 203 lb (92.1 kg)   Height: 5' 8\" (1.727 m)        Physical Exam:  Constitutional: Appears well kempt. Alert/oriented x3. In no acute distress. Head: Normocephalic No trauma. No deformity. No bruits. Neck: Supple. ROM normal. No tenderness. No masses. Eyes: PERRLA. Conjunctivae normal. No discharge. Ears: External ears normal. TM normal. No discharge from ears. Nose: Nose normal. Nares patent. Throat: Clear. No exudates. No erythema. Cardiac: Heart with normal rate/rhythm. No murmurs. No gallops. Pulses normal.   Pulmonary: Lungs clear to auscultation bilaterally. In no respiratory distress. No wheezing. No rales. No rhonci. Gastrointestinal: Bowel sounds present. Abdomen soft and nondistended. Musculoskeletal: Moves all extremities with good ROM. Non-tender. No swelling. No edema. Left hand on the dorsal aspect has an open wound about the size of a dime. He states that he did this about a month ago while he was shooting up drugs, he missed an hit his hand and thinks that it may have gotten infected. He states he kept it covered but that the scab came a few days ago and now he is just bringing hydrogen peroxide. Neurological: No numbness. No tingling. Alert and oriented. At baseline. No confusion. Patellar Reflexes 2+. Psychiatric: Normal thought content. Normal behavior. Normal judgment.      Recommendations     Assessment:  Patient Active Problem List   Diagnosis    Class 1 obesity without serious comorbidity with body mass index (BMI) of 30.0 to 30.9 in adult    Tobacco abuse    Heroin abuse (HCC)    Mild amphetamine-type substance abuse ([facility] [address]      Status of Medical Conditions: stable     Plan:  -Patient is a 57-year-old male with past medical history of polysubstance abuse including heroin, methamphetamines, and tobacco.  He is a current daily user and is interested in quitting. He has been in rehab before and has seen Dr. Poonam Wynne through psychiatry before and has been on Suboxone but states he did not like this facility. We discussed the importance of quitting all drugs and tobacco cessation.  -Chantix  -Wound care referral for wound on his left hand; recommend he cover the wound with triple antibiotic ointment and a bandage until he sees wound care  -Social work referral   -Psych referral     Smoking cessation options discussed with the patient. Advised patient that it is in his best interest to quit smoking. All of the options for smoking cessation including, but not limited to varenicline, bupropion, nictotine replacement, and CBT were discussed with the patient in detail. The patient has elected to participate in a smoking cessation program at this time. A total of 10 minutes was spent on risks, benefits, and options for smoking cessation.     -Previous medical records and/or labs/tests available to me reviewed, any records outstanding not available requested  -The risks, benefits, and medical necessity of all medications, tests labs, and any other orders that were ordered at today's visit were discussed with the patient including all elective or patient requested orders. Decision to order or not order tests or based on this risk/benefit ratio and medical necessity.  -The patient expresses understanding of the plan as I've explained it to him and is in agreement with the current plan.     Follow up: 3 months    Total Time: 45 minutes (chart review and in-exam time)    Signed: Linnea Farmer D.O.  10/12/22  10:29 AM

## 2022-10-13 ENCOUNTER — HOSPITAL ENCOUNTER (EMERGENCY)
Age: 32
Discharge: LEFT BEFORE DISCHARGE | End: 2022-10-13
Attending: STUDENT IN AN ORGANIZED HEALTH CARE EDUCATION/TRAINING PROGRAM

## 2022-10-13 ENCOUNTER — TELEPHONE (OUTPATIENT)
Dept: INTERNAL MEDICINE CLINIC | Facility: CLINIC | Age: 32
End: 2022-10-13

## 2022-10-13 VITALS
HEART RATE: 85 BPM | OXYGEN SATURATION: 99 % | BODY MASS INDEX: 30.31 KG/M2 | WEIGHT: 200 LBS | SYSTOLIC BLOOD PRESSURE: 129 MMHG | DIASTOLIC BLOOD PRESSURE: 81 MMHG | TEMPERATURE: 97.7 F | HEIGHT: 68 IN | RESPIRATION RATE: 18 BRPM

## 2022-10-13 DIAGNOSIS — R71.8 ELEVATED HEMATOCRIT: ICD-10-CM

## 2022-10-13 DIAGNOSIS — D58.2 ELEVATED HEMOGLOBIN (HCC): Primary | ICD-10-CM

## 2022-10-13 DIAGNOSIS — R76.8 POSITIVE HEPATITIS C ANTIBODY TEST: Primary | ICD-10-CM

## 2022-10-13 DIAGNOSIS — D69.6 THROMBOCYTOPENIA (HCC): ICD-10-CM

## 2022-10-13 ASSESSMENT — PAIN - FUNCTIONAL ASSESSMENT: PAIN_FUNCTIONAL_ASSESSMENT: NONE - DENIES PAIN

## 2022-10-13 NOTE — ED NOTES
While preparing to place patient in room, patient advises he was just suppose to get blood work drawn and not seen. I asked him if it was a outpatient order and he though it was to be performed in ER. Patient was wanting to know how long he would have to wait, I advised him that I could not promise a certain time limit. Patient at this time advises that he will leave and call his provider.       Marisol Zamora, MURTAZA  10/13/22 5984

## 2022-10-13 NOTE — TELEPHONE ENCOUNTER
Spoke with the patient again on the phone. He did go to the ER but when he found out he needed to stay there for a while and be seen by a physician he decided to leave. He was under the impression that he just needed labs drawn when in fact I was hoping that he could get repeat labs along with a possible phlebotomy after being evaluated by physician for his hemoglobin. I also spoke with the ER at [facility] and learned that they do not perform phlebotomies in the ER there. I explained to the patient that with his hemoglobin as high as 20.4 and his hematocrit at 62.7, that this is an urgent need to get a phlebotomy so we can lower these numbers and decrease his risk of stroke and blood clot. The patient states that he feels fine and does not have any symptoms of dizziness, headache, numbness or tingling, chest pain, or any other symptoms and that he feels great. We discussed when he will go back to the ER so he can get labs drawn and a possible phlebotomy and he stated that he probably cannot make it work with his work schedule. We will discuss this tomorrow to see if we can get him to go to the ER at a different hospital where they perform phlebotomies. Kasey Rios D.O.   Internal Medicine   [facility]

## 2022-10-13 NOTE — TELEPHONE ENCOUNTER
Spoke to the patient on the phone concerning his labs from yesterday. I advised the patient that he should go to the ER to get repeat blood work done and maybe be evaluated for his abnormal labs. I also advised him that I put in an urgent hematology referral which we will try and expedite for him. I will also call the ER had a time to let them know he will be coming in for repeat labs. Moses Nichols D.O.   Internal Medicine   [facility]

## 2022-10-14 ENCOUNTER — CARE COORDINATION (OUTPATIENT)
Dept: CARE COORDINATION | Facility: CLINIC | Age: 32
End: 2022-10-14

## 2022-10-14 DIAGNOSIS — D75.1 ERYTHROCYTOSIS: ICD-10-CM

## 2022-10-14 DIAGNOSIS — D58.2 ELEVATED HEMOGLOBIN (HCC): Primary | ICD-10-CM

## 2022-10-14 NOTE — PROGRESS NOTES
Spoke with the hematologist on-call, Dr. Alyse Ruvalcaba concerning the patient's recent critical labs. Dr. Alyse Ruvalcaba does not think that the patient needs to be seen urgently in the ER for phlebotomy but does want to see the patient soon, and will try to schedule patient for follow-up next week. He also recommended ordering an ultrasound of the abdomen with focus on the spleen and the liver which we have ordered. I also spoke with the patient and the patient will go to the ER later today for the labs and the ultrasound. Sierra Koehler D.O.   Internal Medicine   [facility]

## 2022-10-14 NOTE — CARE COORDINATION
OTILIO CM in receipt of PCP ref. Attempted initial outreach with ptCarlos Alberto BLAKE for call back on voice mail. Will try again Monday, 10/17.

## 2022-10-15 LAB
HCV AB S/CO SERPL IA: >11 S/CO RATIO (ref 0–0.9)
HCV RNA SERPL NAA+PROBE-ACNC: NORMAL IU/ML
INTERPRETATION: NORMAL
REF LAB TEST REF RANGE: NORMAL

## 2022-10-17 ENCOUNTER — CARE COORDINATION (OUTPATIENT)
Dept: CARE COORDINATION | Facility: CLINIC | Age: 32
End: 2022-10-17

## 2022-10-17 NOTE — PROGRESS NOTES
Spoke with patient over the phone. He was irritated that we had called him again at work, stating that he cannot be taking calls at work and would like us to call his mother. We did call his mother who did not answer. I had originally called in reference to the repeat labs that we try getting him done last week for his elevated hemoglobin and hematocrit and low platelets. I have sent Dr. Hollis James a message who will be seeing him on the 19th or 2 days from now that these labs will likely not be completed by the time he sees the patient. Kiko Cheek D.O.   Internal Medicine   [facility]

## 2022-10-18 NOTE — PROGRESS NOTES
New Patient Abstract    Reason for Referral: Elevated hemoglobin; Elevated hematocrit; Thrombocytopenia    Referring Provider: Dulce Kelley DO    Primary Care Provider: Dulce Kelley DO    Family History of Cancer/Hematologic Disorders: No family history of oncological or hematological disorders documented. Presenting Symptoms:  No relevant physical symptoms documented. Narrative with recent with Results/Procedures/Biopsies and Dates completed: Mr. Angelica Meehan is a 67-year-old white male with a history of tobacco use (current 3/4 pack per day smoker), daily illicit drug use (opiates, crystal meth, and heroine), fatty liver, mildly enlarged spleen, depression, and pilonidal abscess. He presented to Dr. Julio Rodriguez on 10/12/22 to establish primary care. He admitted to current daily use of heroine and crystal meth and expressed interest in quitting. Review of systems was negative and physical exam was unremarkable except for an open wound about the size of a dime on the dorsal aspect of his left hand. Routine labs drawn the same day were significant for WBC 3.5, RBC 6.85, HGB 20.4, HCT 62.7, PLT 86, segs abs 1.6, and lymphocytes 47. Upon review of labs, patient was sent to the ED per his PCP for repeat blood work done and evaluation of critical labs. Urgent referral was also placed to  for hematology evaluation and treatment of elevated H&H and thrombocytopenia. Patient did go to the [facility] ED as instructed, but he left before being seen by a physician and before repeat labs could be drawn. On 10/14/22, PCP called and spoke with on-call hematologist, Dr. Marcia Mai, concerning the patient's recent critical labs. Dr. Marcia Mai agreed to see patient soon in outpatient clinic and suggested an abdominal ultrasound with focus on the spleen and the liver be ordered. Abdominal ultrasound was ordered but has not yet been performed. Patient denies any symptoms of dizziness, headache, numbness or tingling, or chest pain. 10/12/22 11:07   WBC 3.5 (L)   RBC 6.85 (H)   Hemoglobin Quant 20.4 (HH)   Hematocrit 62.7 (H)   MCV 91.5   MCH 29.8   MCHC 32.5   MPV 9.5   RDW 13.6   Platelet Count 86 (L)   Absolute Mono # 0.2   Eosinophils % 3   Basophils Absolute 0.0   Differential Type AUTOMATED   Seg Neutrophils 45   Segs Absolute 1.6 (L)   Lymphocytes 47 (H)   Absolute Lymph # 1.6   Monocytes 5   Absolute Eos # 0.1   Basophils 1   Immature Granulocytes 0   Nucleated Red Blood Cells 0.00   Absolute Immature Granulocyte 0.0            10/12/22 11:07   WBC 3.5 (L)   RBC 6.85 (H)   Hemoglobin Quant 20.4 (HH)   Hematocrit 62.7 (H)   MCV 91.5   MCH 29.8   MCHC 32.5   MPV 9.5   RDW 13.6   Platelet Count 86 (L)   Absolute Mono # 0.2   Eosinophils % 3   Basophils Absolute 0.0   Differential Type AUTOMATED   Seg Neutrophils 45   Segs Absolute 1.6 (L)   Lymphocytes 47 (H)   Absolute Lymph # 1.6   Monocytes 5   Absolute Eos # 0.1   Basophils 1   Immature Granulocytes 0   Nucleated Red Blood Cells 0.00   Absolute Immature Granulocyte 0.0      10/12/22 11:07   Sodium 137   Potassium 4.3   Chloride 103   CO2 30   BUN,BUNPL 14   Creatinine 1.00   Anion Gap 4   Est, Glom Filt Rate >60   Glucose, Random 111 (H)   CALCIUM, SERUM, 983633 9.4   Chol/HDL Ratio 4.4   CHOLESTEROL, TOTAL, 124692 135   HDL Cholesterol 31 (L)   LDL Calculated 57.2   Triglycerides 234 (H)   VLDL Cholesterol Calculated 46.8 (H)      10/12/22 11:07   HIV 1/2 Result Comment SEE NOTE   HIV 1/2 Interp NONREACTIVE   Hep C Qnt HCV Not Detected   HCV Ab >11.0 (H)     Notes from Referring Provider: None    Other Pertinent Information: None    Presented at Tumor Board:  N/A

## 2022-10-19 ENCOUNTER — CLINICAL DOCUMENTATION (OUTPATIENT)
Dept: ONCOLOGY | Age: 32
End: 2022-10-19

## 2022-10-19 ENCOUNTER — HOSPITAL ENCOUNTER (OUTPATIENT)
Dept: LAB | Age: 32
Discharge: HOME | End: 2022-10-22
Payer: COMMERCIAL

## 2022-10-19 ENCOUNTER — OFFICE VISIT (OUTPATIENT)
Dept: ONCOLOGY | Age: 32
End: 2022-10-19
Payer: COMMERCIAL

## 2022-10-19 ENCOUNTER — CARE COORDINATION (OUTPATIENT)
Dept: CARE COORDINATION | Facility: CLINIC | Age: 32
End: 2022-10-19

## 2022-10-19 VITALS
SYSTOLIC BLOOD PRESSURE: 132 MMHG | WEIGHT: 200 LBS | TEMPERATURE: 98.3 F | OXYGEN SATURATION: 99 % | DIASTOLIC BLOOD PRESSURE: 89 MMHG | RESPIRATION RATE: 16 BRPM | HEIGHT: 66 IN | BODY MASS INDEX: 32.14 KG/M2 | HEART RATE: 77 BPM

## 2022-10-19 DIAGNOSIS — D58.2 ELEVATED HEMOGLOBIN (HCC): Primary | ICD-10-CM

## 2022-10-19 DIAGNOSIS — R71.8 ELEVATED HEMATOCRIT: ICD-10-CM

## 2022-10-19 DIAGNOSIS — D58.2 ELEVATED HEMOGLOBIN (HCC): ICD-10-CM

## 2022-10-19 DIAGNOSIS — D69.6 THROMBOCYTOPENIA (HCC): ICD-10-CM

## 2022-10-19 DIAGNOSIS — B19.20 HEPATITIS C VIRUS INFECTION WITHOUT HEPATIC COMA, UNSPECIFIED CHRONICITY: ICD-10-CM

## 2022-10-19 LAB
ALBUMIN SERPL-MCNC: 4.1 G/DL (ref 3.5–5)
ALBUMIN/GLOB SERPL: 1 {RATIO} (ref 0.4–1.6)
ALP SERPL-CCNC: 100 U/L (ref 50–136)
ALT SERPL-CCNC: 77 U/L (ref 12–65)
ANION GAP SERPL CALC-SCNC: 4 MMOL/L (ref 2–11)
AST SERPL-CCNC: 44 U/L (ref 15–37)
BASOPHILS # BLD: 0 K/UL (ref 0–0.2)
BASOPHILS NFR BLD: 1 % (ref 0–2)
BILIRUB SERPL-MCNC: 0.7 MG/DL (ref 0.2–1.1)
BUN SERPL-MCNC: 14 MG/DL (ref 6–23)
CALCIUM SERPL-MCNC: 9.1 MG/DL (ref 8.3–10.4)
CHLORIDE SERPL-SCNC: 105 MMOL/L (ref 101–110)
CO2 SERPL-SCNC: 30 MMOL/L (ref 21–32)
CREAT SERPL-MCNC: 1.2 MG/DL (ref 0.8–1.5)
CRP SERPL-MCNC: 0.8 MG/DL (ref 0–0.9)
DIFFERENTIAL METHOD BLD: ABNORMAL
EOSINOPHIL # BLD: 0.1 K/UL (ref 0–0.8)
EOSINOPHIL NFR BLD: 2 % (ref 0.5–7.8)
ERYTHROCYTE [DISTWIDTH] IN BLOOD BY AUTOMATED COUNT: 12.7 % (ref 11.9–14.6)
ERYTHROCYTE [SEDIMENTATION RATE] IN BLOOD: 4 MM/HR (ref 0–15)
FERRITIN SERPL-MCNC: 55 NG/ML (ref 8–388)
FOLATE SERPL-MCNC: 7.3 NG/ML (ref 3.1–17.5)
GLOBULIN SER CALC-MCNC: 4 G/DL (ref 2.8–4.5)
GLUCOSE SERPL-MCNC: 97 MG/DL (ref 65–100)
HAPTOGLOB SERPL-MCNC: 85 MG/DL (ref 30–200)
HCT VFR BLD AUTO: 39.9 %
HGB BLD-MCNC: 13 G/DL (ref 13.6–17.2)
HGB RETIC QN AUTO: 37 PG (ref 29–35)
IMM GRANULOCYTES # BLD AUTO: 0 K/UL (ref 0–0.5)
IMM GRANULOCYTES NFR BLD AUTO: 1 % (ref 0–5)
IMM RETICS NFR: 12.8 % (ref 2.3–13.4)
IRON SATN MFR SERPL: 23 %
IRON SERPL-MCNC: 60 UG/DL (ref 35–150)
LDH SERPL L TO P-CCNC: 230 U/L (ref 100–190)
LYMPHOCYTES # BLD: 1.5 K/UL (ref 0.5–4.6)
LYMPHOCYTES NFR BLD: 28 % (ref 13–44)
Lab: NORMAL
Lab: NORMAL
MCH RBC QN AUTO: 29.6 PG (ref 26.1–32.9)
MCHC RBC AUTO-ENTMCNC: 32.6 G/DL (ref 31.4–35)
MCV RBC AUTO: 90.9 FL (ref 82–102)
MONOCYTES # BLD: 0.3 K/UL (ref 0.1–1.3)
MONOCYTES NFR BLD: 6 % (ref 4–12)
NEUTS SEG # BLD: 3.3 K/UL (ref 1.7–8.2)
NEUTS SEG NFR BLD: 63 % (ref 43–78)
NRBC # BLD: 0 K/UL (ref 0–0.2)
PLATELET # BLD AUTO: 267 K/UL (ref 150–450)
PMV BLD AUTO: 8.7 FL (ref 9.4–12.3)
POTASSIUM SERPL-SCNC: 4.3 MMOL/L (ref 3.5–5.1)
PROT SERPL-MCNC: 8.1 G/DL (ref 6.3–8.2)
RBC # BLD AUTO: 4.39 M/UL (ref 4.23–5.6)
REFERENCE LAB: NORMAL
RETICS # AUTO: 0.06 M/UL (ref 0.03–0.1)
RETICS/RBC NFR AUTO: 1.3 % (ref 0.3–2)
SODIUM SERPL-SCNC: 139 MMOL/L (ref 133–143)
TIBC SERPL-MCNC: 264 UG/DL (ref 250–450)
VIT B12 SERPL-MCNC: 818 PG/ML (ref 193–986)
WBC # BLD AUTO: 5.2 K/UL (ref 4.3–11.1)

## 2022-10-19 PROCEDURE — 99204 OFFICE O/P NEW MOD 45 MIN: CPT | Performed by: INTERNAL MEDICINE

## 2022-10-19 PROCEDURE — 82728 ASSAY OF FERRITIN: CPT

## 2022-10-19 PROCEDURE — 86430 RHEUMATOID FACTOR TEST QUAL: CPT

## 2022-10-19 PROCEDURE — 83521 IG LIGHT CHAINS FREE EACH: CPT

## 2022-10-19 PROCEDURE — 86038 ANTINUCLEAR ANTIBODIES: CPT

## 2022-10-19 PROCEDURE — 83615 LACTATE (LD) (LDH) ENZYME: CPT

## 2022-10-19 PROCEDURE — 82525 ASSAY OF COPPER: CPT

## 2022-10-19 PROCEDURE — 83921 ORGANIC ACID SINGLE QUANT: CPT

## 2022-10-19 PROCEDURE — 82607 VITAMIN B-12: CPT

## 2022-10-19 PROCEDURE — 85025 COMPLETE CBC W/AUTO DIFF WBC: CPT

## 2022-10-19 PROCEDURE — 85652 RBC SED RATE AUTOMATED: CPT

## 2022-10-19 PROCEDURE — 86140 C-REACTIVE PROTEIN: CPT

## 2022-10-19 PROCEDURE — 86160 COMPLEMENT ANTIGEN: CPT

## 2022-10-19 PROCEDURE — 36415 COLL VENOUS BLD VENIPUNCTURE: CPT

## 2022-10-19 PROCEDURE — 83010 ASSAY OF HAPTOGLOBIN QUANT: CPT

## 2022-10-19 PROCEDURE — 85046 RETICYTE/HGB CONCENTRATE: CPT

## 2022-10-19 PROCEDURE — 82390 ASSAY OF CERULOPLASMIN: CPT

## 2022-10-19 PROCEDURE — 84156 ASSAY OF PROTEIN URINE: CPT

## 2022-10-19 PROCEDURE — 82784 ASSAY IGA/IGD/IGG/IGM EACH: CPT

## 2022-10-19 PROCEDURE — 82746 ASSAY OF FOLIC ACID SERUM: CPT

## 2022-10-19 PROCEDURE — 83540 ASSAY OF IRON: CPT

## 2022-10-19 PROCEDURE — 86235 NUCLEAR ANTIGEN ANTIBODY: CPT

## 2022-10-19 NOTE — PROGRESS NOTES
Received verbal permission during office visit to speak with pt's mother regarding any lab results/clinical needs Teresa Tobar [phone]). Pt provided work office number for if he needs to be reached urgently ([phone])  OK to discuss via [employer] as well.

## 2022-10-19 NOTE — CARE COORDINATION
OTILIO CM outreach with pt's mother, Sydnie Black. On consent form. Provided info re substance abuse tx through [facility]. Provided # for [facility] intake to inquire about psychiatrists with addiction speciality. Also provided names/numbers for 3 psych practitioners in the area who take Yash Re and have addiction specialty. Provided contact info for [place] smoking cessation program -  1 800 QUIT NOW. Outreach 2 weeks.     Cc: Dr. Doreen Quiles

## 2022-10-19 NOTE — PROGRESS NOTES
[facility] Hematology and Oncology: Office Visit New Patient H & P    Reason for visit:  Elevated hemoglobin; Elevated hematocrit; Thrombocytopenia  Overview:  Mr. Eliseo Larson is a 75-year-old white male with a history of tobacco use (current 3/4 pack per day smoker), daily illicit drug use (opiates, crystal meth, and heroine), fatty liver, mildly enlarged spleen, depression, and pilonidal abscess. He presented to Dr. Sandip Hess on 10/12/22 to establish primary care. He admitted to current daily use of heroine and crystal meth and expressed interest in quitting. Review of systems was negative and physical exam was unremarkable except for an open wound about the size of a dime on the dorsal aspect of his left hand. Routine labs drawn the same day were significant for WBC 3.5, RBC 6.85, HGB 20.4, HCT 62.7, PLT 86, segs abs 1.6, and lymphocytes 47. Upon review of labs, patient was sent to the ED per his PCP for repeat blood work done and evaluation of critical labs. Urgent referral was also placed to [facility] for hematology evaluation and treatment of elevated H&H and thrombocytopenia. History of Present Illness: On evaluation today, he feels well overall. Continues to smoke about half pack per day. Admits to daily illicit drug use. Acknowledges unhealthy eating habits attributable to his inconsistent job situation. He denies unexplained fevers, unintentional weight loss, drenching night sweats, skin rash, pruritus, swollen glands in the body, early satiety, abdominal pain or distention, chest pain, shortness of breath, cough, nausea, vomiting/hematemesis, nosebleeds/gum bleed, black/bloody stools. Recent lab work showed positive anti-HCV antibody. Review of Systems:  14 point ROS was negative except as mentioned above. ECOG PERFORMANCE STATUS - 0-Fully active, able to carry on all pre-disease performance without restriction. Pain - /10.  None/Minimal pain - not affecting QOL     Fatigue - No flowsheet data found.  Distress - No flowsheet data found. Reviewed and updated this visit by provider:          Current Outpatient Medications   Medication Sig Dispense Refill    varenicline (CHANTIX) 1 MG tablet Take 1 tablet by mouth 2 times daily (Patient not taking: Reported on 10/19/2022) 180 tablet 1    nicotine (NICODERM CQ) 14 MG/24HR Place 1 patch onto the skin daily (Patient not taking: Reported on 10/19/2022) 30 patch 3    mupirocin (BACTROBAN) 2 % ointment Apply topically daily (Patient not taking: Reported on 10/12/2022)       No current facility-administered medications for this visit. OBJECTIVE:  /89   Pulse 77   Temp 98.3 °F (36.8 °C)   Resp 16   Ht 5' 6\" (1.676 m) Comment: taken w/o shoes  Wt 200 lb (90.7 kg)   SpO2 99%   BMI 32.28 kg/m²     Physical Exam:  Patient alert and oriented x 3, in no acute distress  Integumentary: No Pallor, no icterus, has a roughly dime sized open wound on the dorsal aspect of left hand  HEENT: Moist mucous membranes, normal oropharynx  Lymph nodes: No cervical, axillary or inguinal lymphadenopathy. Cardiovascular:RRR, S1, S2 present, no m/r/g   Lungs: Clear to auscultation, no rales or wheezing, no accessory muscle use  Abdomen: Soft, nontender, obese, I am unable to palpate liver or spleen.   Extremities: No peripheral edema, no joint swelling  Neurological: patient can follow commands and move all extremities    Labs:  Lab Results   Component Value Date    WBC 3.5 (L) 10/12/2022    HGB 20.4 (HH) 10/12/2022    HCT 62.7 (H) 10/12/2022    MCV 91.5 10/12/2022    PLT 86 (L) 10/12/2022       Lab Results   Component Value Date    LYMPHOPCT 47 (H) 10/12/2022    LYMPHSABS 1.6 10/12/2022    MONOPCT 5 10/12/2022    MONOSABS 0.2 10/12/2022    EOSABS 0.1 10/12/2022    BASOPCT 1 10/12/2022       Lab Results   Component Value Date     10/12/2022    K 4.3 10/12/2022     10/12/2022    CO2 30 10/12/2022    BUN 14 10/12/2022    CREATININE 1.00 10/12/2022 GLUCOSE 111 (H) 10/12/2022    CALCIUM 9.4 10/12/2022    LABGLOM >60 10/12/2022           Imaging:  No results found. Problems:  1. Elevated hemoglobin (HCC)    2. Elevated hematocrit    3. Thrombocytopenia ([address]    4. Hepatitis C virus infection without hepatic coma, unspecified chronicity    -Illicit substance abuse    I had an extensive discussion with the patient and reviewed his most recent lab findings. Reviewed the differential diagnoses of cytopenias which is broad and includes chronic liver disease, bone marrow disorders, medications, immune mediated disorders etc.  Also reviewed differential diagnoses for erythrocytosis including but not limited to primary causes such as EPO receptor mutation, polycythemia vera and very secondary causes. PLAN:   We will proceed with initial serologic work-up including CBC with differential, smear review by pathologist, reticulocyte count, LDH, haptoglobin, vitamin B12, folate, methylmalonic acid, ferritin, iron/TIBC, SAYDA, RF, ESR, CRP, C3/C4, SPEP, UPEP, free light chain ratio, immunoglobulins, copper and ceruloplasmin testing and peripheral blood flow cytometry. - ESR, iron panel/ferritin  - CO  - Epo level, MPN panel  -GI referral   Bone marrow biopsy may become indicated based on the results of the above. All questions were answered to the best of my abilities. Return office visit with labs prior as scheduled. Pamela Bloom MD  [facility] Hematology and Oncology  [address] [place] [address]  Office : [phone]  Fax : [phone]    Elements of this note have been dictated using speech recognition software. As a result, errors of speech recognition may have occurred.

## 2022-10-19 NOTE — PATIENT INSTRUCTIONS
Patient Instructions from Today's Visit    Reason for Visit:  Follow up - Elevated hemoglobin    Diagnosis Information:  [email or web address] net/about-us/[email or web address]-answers-patient-education-materials/mgdh-efijhxd-dhjo-sheets    Plan: We are seeing you today for elevated hemoglobin, decreased platelet count, and decreased white blood cell count. We will need to do additional lab work today to further investigate this. We have discussed possible causes for these abnormalities at length. Your hepatitis C could explain a lot of this. Referral to gastroenterology for Hepatitis C treatment. Fatty liver disease could also contribute to some of these problems. We will need to rule out primary polycythemia vera. We have discussed the causes of secondary polycythemia vera. Smoking cessation highly recommended. Personal risk factors discussed at length. You will need to let us know if you start to develop persistent drenching night sweats, unexplained fever (100.4 or greater), abdominal swelling, several abdominal pain, or new enlarged lymph nodes. Please call us if you have any questions or concerns before your next visit. Follow Up: Follow up in 6 weeks with Dr. Hollis James, with labs prior. Recent Lab Results:  No visits with results within 3 Day(s) from this visit. Latest known visit with results is:   Orders Only on 10/12/2022   Component Date Value Ref Range Status    HCV Ab 10/12/2022 >11.0 (A)  0.0 - 0.9 s/co ratio Final    Comment: (NOTE)  Performed At: [address] [id number]  To Perez MD SY:[id number]      HIV 1/2 Interp 10/12/2022 NONREACTIVE  NONREACTIVE   Final    HIV 1/2 Result Comment 10/12/2022 SEE NOTE    Final    Comment: While this assay is highly sensitive, a non-reactive/negative result for HIV antibodies HIV-1 and HIV-2 and p24 antigen, does not exclude the possibility of exposure to or infection with HIV.   HIV antibodies and/or p24 antigen may be undetectable in some stages of the infection and in some clinical conditions. Test performed by the [employer] HIV Ag/Ab Combo (CHIV), 4th generation assay. Recommend consulting relevant CDC guidelines for informing test subject of the result and its interpretation.       Cholesterol, Total 10/12/2022 135  <200 MG/DL Final    Comment: Borderline High: 200-239 mg/dL  High: Greater than or equal to 240 mg/dL      Triglycerides 10/12/2022 234 (A)  35 - 150 MG/DL Final    Comment: Borderline High: 150-199 mg/dL, High: 200-499 mg/dL  Very High: Greater than or equal to 500 mg/dL      HDL 10/12/2022 31 (A)  40 - 60 MG/DL Final    LDL Calculated 10/12/2022 57.2  <100 MG/DL Final    Comment: Near Optimal: 100-129 mg/dL  Borderline High: 130-159, High: 160-189 mg/dL  Very High: Greater than or equal to 190 mg/dL      VLDL Cholesterol Calculated 10/12/2022 46.8 (A)  6.0 - 23.0 MG/DL Final    Chol/HDL Ratio 10/12/2022 4.4    Final    WBC 10/12/2022 3.5 (A)  4.3 - 11.1 K/uL Final    RBC 10/12/2022 6.85 (A)  4.23 - 5.6 M/uL Final    Hemoglobin 10/12/2022 20.4 (A)  13.6 - 17.2 g/dL Final    Hematocrit 10/12/2022 62.7 (A)  41.1 - 50.3 % Final    MCV 10/12/2022 91.5  82 - 102 FL Final    MCH 10/12/2022 29.8  26.1 - 32.9 PG Final    MCHC 10/12/2022 32.5  31.4 - 35.0 g/dL Final    RDW 10/12/2022 13.6  11.9 - 14.6 % Final    Platelets 44/69/5566 86 (A)  150 - 450 K/uL Final    MPV 10/12/2022 9.5  9.4 - 12.3 FL Final    nRBC 10/12/2022 0.00  0.0 - 0.2 K/uL Final    **Note: Absolute NRBC parameter is now reported with Hemogram**    Differential Type 10/12/2022 AUTOMATED    Final    Seg Neutrophils 10/12/2022 45  43 - 78 % Final    Lymphocytes 10/12/2022 47 (A)  13 - 44 % Final    Monocytes 10/12/2022 5  4.0 - 12.0 % Final    Eosinophils % 10/12/2022 3  0.5 - 7.8 % Final    Basophils 10/12/2022 1  0.0 - 2.0 % Final    Immature Granulocytes 10/12/2022 0  0.0 - 5.0 % Final    Segs Absolute 10/12/2022 1.6 (A)  1.7 - 8.2 K/UL Final    Absolute Lymph # 10/12/2022 1.6  0.5 - 4.6 K/UL Final    Absolute Mono # 10/12/2022 0.2  0.1 - 1.3 K/UL Final    Absolute Eos # 10/12/2022 0.1  0.0 - 0.8 K/UL Final    Basophils Absolute 10/12/2022 0.0  0.0 - 0.2 K/UL Final    Absolute Immature Granulocyte 10/12/2022 0.0  0.0 - 0.5 K/UL Final    Sodium 10/12/2022 137  133 - 143 mmol/L Final    Potassium 10/12/2022 4.3  3.5 - 5.1 mmol/L Final    Chloride 10/12/2022 103  101 - 110 mmol/L Final    CO2 10/12/2022 30  21 - 32 mmol/L Final    Anion Gap 10/12/2022 4  2 - 11 mmol/L Final    Glucose 10/12/2022 111 (A)  65 - 100 mg/dL Final    BUN 10/12/2022 14  6 - 23 MG/DL Final    Creatinine 10/12/2022 1.00  0.8 - 1.5 MG/DL Final    Est, Glom Filt Rate 10/12/2022 >60  >60 ml/min/1.73m2 Final    Comment:   Pediatric calculator link: [email or web address] [email or web address] org/professionals/kdoqi/[email or web address]_[email or web address]    Effective Oct 3, 2022    These results are not intended for use in patients <25years of age. eGFR results are calculated without a race factor using  the 2021 CKD-EPI equation. Careful clinical correlation is recommended, particularly when comparing to results calculated using previous equations. The CKD-EPI equation is less accurate in patients with extremes of muscle mass, extra-renal metabolism of creatinine, excessive creatine ingestion, or following therapy that affects renal tubular secretion. Calcium 10/12/2022 9.4  8.3 - 10.4 MG/DL Final    Hep C Qnt 10/12/2022 HCV Not Detected  IU/mL Final    TEST INFORMATION 10/12/2022 Comment    Final    Comment: (NOTE)  The quantitative range of this assay is 15 IU/mL to 100 million  IU/mL. Performed At: [facility] & [address] [id number]  Deysi Alonso MD MJ:[id number]      Interpretation 10/12/2022 Comment    Final    Comment: (NOTE)  Positive HCV antibody screen without the presence of HCV RNA  is consistent with a resolved past infection or a false  positive HCV antibody.  Consider repeat testing after one  month. Performed At: [facility]  [address] [facility] [id number]  Denise Roberts MD [id number]           Treatment Summary has been discussed and given to patient: N/A        -------------------------------------------------------------------------------------------------------------------  Please call our office at [phone] if you have any  of the following symptoms:   Fever of 100.5 or greater  Chills  Shortness of breath  Swelling or pain in one leg    After office hours an answering service is available and will contact a provider for emergencies or if you are experiencing any of the above symptoms. Patient does express an interest in My Chart. My Chart log in information explained on the after visit summary printout at the [address] desk.     Cyndee Escobar RN

## 2022-10-20 LAB
ANA SER QL: POSITIVE
C3 SERPL-MCNC: 153 MG/DL (ref 82–167)
C4 SERPL-MCNC: 23 MG/DL (ref 12–38)
CENTROMERE B AB SER-ACNC: <0.2 AI (ref 0–0.9)
CERULOPLASMIN SERPL-MCNC: 25.5 MG/DL (ref 16–31)
CHROMATIN AB SERPL-ACNC: <0.2 AI (ref 0–0.9)
COPPER SERPL-MCNC: 115 UG/DL (ref 69–132)
DSDNA AB SER-ACNC: 1 IU/ML (ref 0–9)
ENA JO1 AB SER-ACNC: <0.2 AI (ref 0–0.9)
ENA RNP AB SER-ACNC: 1.5 AI (ref 0–0.9)
ENA SCL70 AB SER-ACNC: <0.2 AI (ref 0–0.9)
ENA SM AB SER-ACNC: <0.2 AI (ref 0–0.9)
ENA SS-A AB SER-ACNC: <0.2 AI (ref 0–0.9)
ENA SS-B AB SER-ACNC: <0.2 AI (ref 0–0.9)
Lab: ABNORMAL
PATH REV BLD -IMP: NORMAL
RHEUMATOID FACT SER QL LA: NEGATIVE

## 2022-10-21 DIAGNOSIS — R76.8 POSITIVE ANA (ANTINUCLEAR ANTIBODY): Primary | ICD-10-CM

## 2022-10-21 LAB
ALBUMIN MFR UR ELPH: 26 %
ALBUMIN SERPL ELPH-MCNC: 4.4 G/DL (ref 2.9–4.4)
ALBUMIN/GLOB SERPL: 1.4 {RATIO} (ref 0.7–1.7)
ALPHA1 GLOB MFR UR ELPH: 18.1 %
ALPHA1 GLOB SERPL ELPH-MCNC: 0.2 G/DL (ref 0–0.4)
ALPHA2 GLOB 24H MFR UR ELPH: 22.4 %
ALPHA2 GLOB SERPL ELPH-MCNC: 0.5 G/DL (ref 0.4–1)
B-GLOBULIN 24H MFR UR ELPH: 20.5 %
B-GLOBULIN SERPL ELPH-MCNC: 0.9 G/DL (ref 0.7–1.3)
GAMMA GLOB 24H MFR UR ELPH: 13.1 %
GAMMA GLOB SERPL ELPH-MCNC: 1.7 G/DL (ref 0.4–1.8)
GLOBULIN SER-MCNC: 3.3 G/DL (ref 2.2–3.9)
IGA SERPL-MCNC: 180 MG/DL (ref 90–386)
IGG SERPL-MCNC: 1548 MG/DL (ref 603–1613)
IGM SERPL-MCNC: 385 MG/DL (ref 20–172)
INTERPRETATION SERPL IEP-IMP: ABNORMAL
KAPPA LC FREE SER-MCNC: 31.3 MG/L (ref 3.3–19.4)
KAPPA LC FREE/LAMBDA FREE SER: 2.11 {RATIO} (ref 0.26–1.65)
LABORATORY COMMENT REPORT: NORMAL
LAMBDA LC FREE SERPL-MCNC: 14.8 MG/L (ref 5.7–26.3)
M PROTEIN MFR UR ELPH: NORMAL %
M PROTEIN SERPL ELPH-MCNC: ABNORMAL G/DL
PROT SERPL-MCNC: 7.7 G/DL (ref 6–8.5)
PROT UR-MCNC: 16.8 MG/DL

## 2022-10-24 LAB — METHYLMALONATE SERPL-SCNC: 163 NMOL/L (ref 0–378)

## 2022-10-26 LAB
FLOW CYTOMETRY RESULTS: NORMAL
SPECIMEN SOURCE: NORMAL
TEST ORDERED: NORMAL

## 2022-11-03 ENCOUNTER — CARE COORDINATION (OUTPATIENT)
Dept: CARE COORDINATION | Facility: CLINIC | Age: 32
End: 2022-11-03

## 2022-11-03 NOTE — CARE COORDINATION
Attempted outreach with pt's mother, Sami Garcia. Msliyah left for call back on voice mail. Rec'd return call from Ms. Kelly later in the day. Msg left that she has all info provided for  substance abuse tx , psychiatrists with addiction specialty and smoking cessation program. Pt has not followed through, however. Ms. Sally Flanagan appreciative of info and will contact [facility] in future if needs arise. Case will be closed.   Cc: Dr. Maco Rincon

## 2022-11-17 ENCOUNTER — HOSPITAL ENCOUNTER (OUTPATIENT)
Dept: ULTRASOUND IMAGING | Age: 32
Discharge: HOME | End: 2022-11-20

## 2022-11-17 DIAGNOSIS — R71.8 ELEVATED HEMATOCRIT: ICD-10-CM

## 2022-11-17 DIAGNOSIS — D58.2 ELEVATED HEMOGLOBIN (HCC): ICD-10-CM

## 2022-11-17 DIAGNOSIS — D69.6 THROMBOCYTOPENIA (HCC): ICD-10-CM

## 2022-11-18 ENCOUNTER — TELEPHONE (OUTPATIENT)
Dept: ONCOLOGY | Age: 32
End: 2022-11-18

## 2022-11-18 DIAGNOSIS — D69.6 THROMBOCYTOPENIA (HCC): ICD-10-CM

## 2022-11-18 DIAGNOSIS — D58.2 ELEVATED HEMOGLOBIN (HCC): Primary | ICD-10-CM

## 2022-11-18 DIAGNOSIS — R71.8 ELEVATED HEMATOCRIT: ICD-10-CM

## 2022-11-18 NOTE — TELEPHONE ENCOUNTER
Attempted to contact pt, no answer. LVM. Pt needs lab only apt to check EPO. MyChart msg sent to pt as well.

## 2022-11-28 DIAGNOSIS — D58.2 ELEVATED HEMOGLOBIN (HCC): Primary | ICD-10-CM

## 2022-11-28 DIAGNOSIS — D69.6 THROMBOCYTOPENIA (HCC): ICD-10-CM

## 2023-03-06 ENCOUNTER — OFFICE VISIT (OUTPATIENT)
Dept: INTERNAL MEDICINE CLINIC | Facility: CLINIC | Age: 33
End: 2023-03-06
Payer: COMMERCIAL

## 2023-03-06 VITALS
SYSTOLIC BLOOD PRESSURE: 116 MMHG | OXYGEN SATURATION: 98 % | HEIGHT: 66 IN | BODY MASS INDEX: 33.72 KG/M2 | HEART RATE: 83 BPM | DIASTOLIC BLOOD PRESSURE: 67 MMHG | TEMPERATURE: 98.4 F | WEIGHT: 209.8 LBS

## 2023-03-06 DIAGNOSIS — M79.671 RIGHT FOOT PAIN: Primary | ICD-10-CM

## 2023-03-06 PROCEDURE — 99213 OFFICE O/P EST LOW 20 MIN: CPT | Performed by: NURSE PRACTITIONER

## 2023-03-06 RX ORDER — BUPRENORPHINE HYDROCHLORIDE 8 MG/1
8 TABLET SUBLINGUAL DAILY
COMMUNITY

## 2023-03-06 RX ORDER — DICLOFENAC POTASSIUM 50 MG/1
50 TABLET, FILM COATED ORAL 2 TIMES DAILY PRN
Qty: 30 TABLET | Refills: 0 | Status: SHIPPED | OUTPATIENT
Start: 2023-03-06

## 2023-03-06 ASSESSMENT — ENCOUNTER SYMPTOMS
ABDOMINAL PAIN: 0
BACK PAIN: 0
CONSTIPATION: 0
SORE THROAT: 0
EYE PAIN: 0
NAUSEA: 0
COUGH: 0
VOMITING: 0
RHINORRHEA: 0
SHORTNESS OF BREATH: 0
SINUS PAIN: 0
DIARRHEA: 0

## 2023-03-06 NOTE — PROGRESS NOTES
[facility]  [address] [facility]  Tel# [phone]  Fax# [phone]     Sterling Lizarraga, [facility]-BC  Family Nurse Practitioner            Date of Visit: 2023     Torey Bello (: 1990) is a 27 y.o. male  established patient, here for evaluation of the following chief complaint(s):    Chief Complaint   Patient presents with    Foot Swelling     This is a Dr. Ayo Laurent pt who is in c/o R foot swelling that began about a week ago. The swelling is related to an injury (dropped a metal flash light on it) and there is pain involved when he lifts his foot up (it does not hurt when he puts pressure on the foot). The pt reports a 6/10 on the pain scale. Patient Care Team:  Ángel Ceja DO as PCP - General (Internal Medicine)  Ángel Ceja DO as PCP - Empaneled Provider  Stacey Hilario MD as Referring Physician  Cathy Freeman MD as Hematology/Oncology (Hematology and Oncology)         History of Present Illness      Same Day Appt, Dr. Ayo Laurent pt. Right foot swelling  Presents here today with complaint of right foot pain that started about 1.5 weeks ago after dropping a metal flash light on top of his foot. Associated with swelling. Has tried ice, OTC Aleve/Advil.         Lab Results   Component Value Date     10/19/2022    K 4.3 10/19/2022     10/19/2022    CO2 30 10/19/2022    BUN 14 10/19/2022    CREATININE 1.20 10/19/2022    GLUCOSE 97 10/19/2022    CALCIUM 9.1 10/19/2022    PROT 8.1 10/19/2022    PROT 7.7 10/19/2022    LABALBU 4.1 10/19/2022    LABALBU 4.4 10/19/2022    LABALBU 26.0 10/19/2022    BILITOT 0.7 10/19/2022    ALKPHOS 100 10/19/2022    AST 44 (H) 10/19/2022    ALT 77 (H) 10/19/2022    LABGLOM >60 10/19/2022    GLOB 4.0 10/19/2022    GLOB 3.3 10/19/2022              Patient Active Problem List   Diagnosis    Class 1 obesity without serious comorbidity with body mass index (BMI) of 30.0 to 30.9 in adult    Tobacco abuse    Heroin abuse ([facility]    Mild amphetamine-type substance abuse (HCC)    Elevated hemoglobin (HCC)    Elevated hematocrit    Thrombocytopenia (HCC)       Past Medical History:   Diagnosis Date    Abnormal ultrasound 7/10/2018    Last Assessment & Plan:  Pt found to have a fatty liver and mildly enlarged spleen. Will proceed  with bloodwork. Acute epigastric pain 7/10/2018    Last Assessment & Plan:  Stable. Proceed with EGD to assess for any significant upper GI mucosal  lesions. , Will be evaluating for any esophagitis, gastritis, duodenitis,  H. Pylori, and PUD. Bloating 7/10/2018    Added automatically from request for surgery [id number]     Depression     well controlled with med    Flu-like symptoms 12/24/2017    Foreign body in upper extremity     heroine needle embedded in arm    IV drug abuse ([facility]     heroine    Nausea and vomiting 7/10/2018    Last Assessment & Plan:  Improved. Proceed with EGD     Opiate dependence ([facility]     Pilonidal abscess 2/16/2018    Overview:  Added automatically from request for surgery [id number]        History reviewed. No pertinent surgical history. Family History   Problem Relation Age of Onset    Diabetes Mother          ALLERGY  No Known Allergies        Current Outpatient Medications on File Prior to Visit   Medication Sig Dispense Refill    buprenorphine (SUBUTEX) 8 MG SUBL SL tablet Place 8 mg under the tongue daily. nicotine (NICODERM CQ) 14 MG/24HR Place 1 patch onto the skin daily 30 patch 3     No current facility-administered medications on file prior to visit. Review of Systems  Review of Systems   Constitutional:  Negative for chills, fatigue and fever. HENT:  Negative for congestion, postnasal drip, rhinorrhea, sinus pain, sneezing and sore throat. Eyes:  Negative for pain and visual disturbance. Respiratory:  Negative for cough and shortness of breath. Cardiovascular:  Negative for chest pain, palpitations and leg swelling.    Gastrointestinal: Negative for abdominal pain, constipation, diarrhea, nausea and vomiting. Genitourinary:  Negative for dysuria, frequency and urgency. Musculoskeletal:  Positive for arthralgias (right foot swelling and pain). Negative for back pain and gait problem. Skin:  Negative for rash and wound. Neurological:  Negative for dizziness and headaches. Psychiatric/Behavioral:  Negative for behavioral problems, sleep disturbance and suicidal ideas. The patient is not nervous/anxious. Vitals:    03/06/23 1522   BP: 116/67   Site: Left Upper Arm   Position: Sitting   Cuff Size: Large Adult   Pulse: 83   Temp: 98.4 °F (36.9 °C)   TempSrc: Temporal   SpO2: 98%   Weight: 209 lb 12.8 oz (95.2 kg)   Height: 5' 6\" (1.676 m)              Physical Exam  Physical Exam  Constitutional:       General: He is not in acute distress. Appearance: He is not ill-appearing. Comments: Pt kept dozing off appears to be under the influence    HENT:      Head: Normocephalic and atraumatic. Eyes:      Pupils: Pupils are equal, round, and reactive to light. Cardiovascular:      Rate and Rhythm: Normal rate and regular rhythm. Pulmonary:      Effort: Pulmonary effort is normal. No respiratory distress. Breath sounds: Normal breath sounds. Abdominal:      General: Bowel sounds are normal.      Palpations: Abdomen is soft. Musculoskeletal:         General: Normal range of motion. Cervical back: Neck supple. Right lower leg: Edema (Right foot more edematous than left foot, nonerythematous; R LE + edema) present. Left lower leg: Edema (Left lower left, ankle, foot + edema) present. Comments: Right foot active ROM, negative for pain on dorsiflexion or plantar flexion   Skin:     General: Skin is warm and dry. Neurological:      General: No focal deficit present. Mental Status: He is alert and oriented to person, place, and time.    Psychiatric:         Mood and Affect: Mood normal. Thought Content: Thought content normal.              Assessment/Plan:          ICD-10-CM    1. Right foot pain  M79.671 XR FOOT RIGHT (MIN 3 VIEWS)     diclofenac (CATAFLAM) 50 MG tablet               Nuvia Atkinson was seen today for foot swelling. Diagnoses and all orders for this visit:    Right foot pain  -     XR FOOT RIGHT (MIN 3 VIEWS); Future  -     diclofenac (CATAFLAM) 50 MG tablet; Take 1 tablet by mouth 2 times daily as needed for Pain           Orders Placed This Encounter   Procedures    XR FOOT RIGHT (MIN 3 VIEWS)     Standing Status:   Future     Number of Occurrences:   1     Standing Expiration Date:   6/6/2023            Advise to follow up with PCP Dr. Octavio Enriquez. Follow Up  Return if symptoms worsen or fail to improve, for Follow up with PCP Dr. Milton Wyman.              Nanda Cain DNP, FNP-BC

## 2023-03-06 NOTE — PATIENT INSTRUCTIONS
Please arrive 20 minutes prior to your appointment time to allow time for checking in at the  and rooming with the medical assistant. Please bring your medication bottles at each visit.

## 2023-03-08 ENCOUNTER — OFFICE VISIT (OUTPATIENT)
Dept: GASTROENTEROLOGY | Age: 33
End: 2023-03-08
Payer: COMMERCIAL

## 2023-03-08 VITALS
SYSTOLIC BLOOD PRESSURE: 133 MMHG | OXYGEN SATURATION: 98 % | WEIGHT: 205 LBS | BODY MASS INDEX: 32.95 KG/M2 | DIASTOLIC BLOOD PRESSURE: 80 MMHG | HEART RATE: 103 BPM | HEIGHT: 66 IN

## 2023-03-08 DIAGNOSIS — K76.0 FATTY LIVER: ICD-10-CM

## 2023-03-08 DIAGNOSIS — R76.8 HEPATITIS C ANTIBODY POSITIVE IN BLOOD: Primary | ICD-10-CM

## 2023-03-08 PROCEDURE — 99203 OFFICE O/P NEW LOW 30 MIN: CPT | Performed by: INTERNAL MEDICINE

## 2023-03-08 NOTE — PROGRESS NOTES
Nancy Macdonald (:  1990) is a 27 y.o. male, new patient here for evaluation of the following chief complaint(s):  No chief complaint on file. ASSESSMENT/PLAN:  1. Hepatitis C antibody positive in blood  2. Fatty liver    Discussed the results from the ultrasound and serology with the patient and his mother. He had a prior exposure to hepatitis C during his drug usage time. His hepatitis C RNA is undetectable he is enma enough to have cleared the virus. He has fatty liver diet and lifestyle modification weight loss was discussed. Subjective   SUBJECTIVE/OBJECTIVE  Serology were positive for hepatitis C antibody. However his hepatitis C RNA viral load was undetectable. He has very mild elevation of the serum ALT and AST ultrasound showed fatty liver. He had a prior history of alcohol and drug abuse in the past.  Denied any prior jaundice rectal bleeding. His iron profile is negative. Past Medical History:   Diagnosis Date    Abnormal ultrasound 7/10/2018    Last Assessment & Plan:  Pt found to have a fatty liver and mildly enlarged spleen. Will proceed  with bloodwork. Acute epigastric pain 7/10/2018    Last Assessment & Plan:  Stable. Proceed with EGD to assess for any significant upper GI mucosal  lesions. , Will be evaluating for any esophagitis, gastritis, duodenitis,  H. Pylori, and PUD. Bloating 7/10/2018    Added automatically from request for surgery [id number]     Depression     well controlled with med    Flu-like symptoms 2017    Foreign body in upper extremity     heroine needle embedded in arm    IV drug abuse ([address]     heroine    Nausea and vomiting 7/10/2018    Last Assessment & Plan:  Improved. Proceed with EGD     Opiate dependence ([address]     Pilonidal abscess 2018    Overview:  Added automatically from request for surgery [id number]        No past surgical history on file.           No Known Allergies       Review of Systems   Constitutional: Positive for fatigue. Objective   Physical Exam  HENT:      Head: Normocephalic. Mouth/Throat:      Mouth: Mucous membranes are moist.   Eyes:      General: No scleral icterus. Cardiovascular:      Rate and Rhythm: Normal rate and regular rhythm. Pulmonary:      Effort: No respiratory distress. Abdominal:      General: There is no distension. Tenderness: There is no abdominal tenderness. There is no rebound. Lymphadenopathy:      Cervical: No cervical adenopathy. Skin:     Coloration: Skin is not jaundiced. Findings: No bruising. Neurological:      General: No focal deficit present. Mental Status: He is alert. Current Outpatient Medications   Medication Sig Dispense Refill    buprenorphine (SUBUTEX) 8 MG SUBL SL tablet Place 8 mg under the tongue daily. diclofenac (CATAFLAM) 50 MG tablet Take 1 tablet by mouth 2 times daily as needed for Pain 30 tablet 0    nicotine (NICODERM CQ) 14 MG/24HR Place 1 patch onto the skin daily 30 patch 3     No current facility-administered medications for this visit. Family History   Problem Relation Age of Onset    Diabetes Mother        No follow-ups on file. An electronic signature was used to authenticate this note.     --Christiane Claude, MD

## 2023-03-13 ENCOUNTER — OFFICE VISIT (OUTPATIENT)
Dept: INTERNAL MEDICINE CLINIC | Facility: CLINIC | Age: 33
End: 2023-03-13
Payer: COMMERCIAL

## 2023-03-13 ENCOUNTER — TELEPHONE (OUTPATIENT)
Dept: INTERNAL MEDICINE CLINIC | Facility: CLINIC | Age: 33
End: 2023-03-13

## 2023-03-13 ENCOUNTER — NURSE TRIAGE (OUTPATIENT)
Dept: OTHER | Facility: CLINIC | Age: 33
End: 2023-03-13

## 2023-03-13 VITALS
TEMPERATURE: 98.3 F | OXYGEN SATURATION: 96 % | SYSTOLIC BLOOD PRESSURE: 114 MMHG | WEIGHT: 216.4 LBS | DIASTOLIC BLOOD PRESSURE: 65 MMHG | HEIGHT: 66 IN | BODY MASS INDEX: 34.78 KG/M2 | HEART RATE: 92 BPM

## 2023-03-13 DIAGNOSIS — M79.671 RIGHT FOOT PAIN: Primary | ICD-10-CM

## 2023-03-13 PROCEDURE — 99213 OFFICE O/P EST LOW 20 MIN: CPT | Performed by: NURSE PRACTITIONER

## 2023-03-13 ASSESSMENT — ENCOUNTER SYMPTOMS
SORE THROAT: 0
DIARRHEA: 0
BACK PAIN: 0
NAUSEA: 0
ABDOMINAL PAIN: 0
CONSTIPATION: 0
EYE PAIN: 0
VOMITING: 0
COUGH: 0
SINUS PAIN: 0
RHINORRHEA: 0
SHORTNESS OF BREATH: 0

## 2023-03-13 NOTE — TELEPHONE ENCOUNTER
Location of patient: [place]    Received call from [address] at [facility] with Red Flag Complaint. Subjective: Caller states \"I come in for a xray because my foot was hurting. I had some swelling too and it has gotten worse\"     Current Symptoms: right foot pain and swelling (worsening swelling)    Onset: 2 week ago; worsening      Pain Severity: no pain when sitting/ 9/10 with weight on it    Temperature: denies     What has been tried: NA    Recommended disposition: See in Office Today    Care advice provided, patient verbalizes understanding; denies any other questions or concerns; instructed to call back for any new or worsening symptoms. Patient/Caller agrees with recommended disposition; writer provided warm transfer to [facility] at [facility] for appointment scheduling    Attention Provider: Thank you for allowing me to participate in the care of your patient. The patient was connected to triage in response to information provided to the ECC/PSC. Please do not respond through this encounter as the response is not directed to a shared pool. Reason for Disposition   SEVERE pain (e.g., excruciating, unable to do any normal activities)    Protocols used:  Foot Pain-ADULT-[place]

## 2023-03-13 NOTE — PROGRESS NOTES
[facility]  [place] [address]  Tel# [phone]  Fax# [phone]     Sterling Rico, [facility]-BC  Family Nurse Practitioner            Date of Visit: 2023     Rodney Panda (: 1990) is a [address] y.o. male  established patient, here for evaluation of the following chief complaint(s):    Chief Complaint   Patient presents with    Foot Pain     The pt is in for excruciating R foot pain and swelling. He notes it only hurts when he gets up and moves around. The pt report a 7-8/10 on the pain scale, when present. Patient Care Team:  Monica Ghosh DO as PCP - General (Internal Medicine)  Monica Ghosh DO as PCP - Empaneled Provider  Damián Leslie MD as Referring Physician  Giovany Rasheed MD as Hematology/Oncology (Hematology and Oncology)         History of Present Illness        Same Day Appt  Late Arrival for Appt        R foot pain  Presents here with father with complaint of unrelieved right foot pain. Describes as excruciating pain when apply pressure such as standing or walking. Reviewed foot xray from last week. Pt seen by Dr. Kip Thurman, 10/2022 due to abnormal CBC, pt with positive SAYDA and was referred to rheumatology. Pt denies seeing rheumatology. Also informed pt that he has No Show to his lab appt and fu appt with hematology 2022. Xray Result (most recent):  XR FOOT RIGHT (MIN 3 VIEWS) 2023    Narrative  History: Right foot pain and swelling    EXAM: 3 views right foot    FINDINGS: No fracture, dislocation, or additional acute bony abnormality. Impression  No acute findings.                  Patient Active Problem List   Diagnosis    Class 1 obesity without serious comorbidity with body mass index (BMI) of 30.0 to 30.9 in adult    Tobacco abuse    Heroin abuse (HCC)    Mild amphetamine-type substance abuse (HCC)    Elevated hemoglobin (HCC)    Elevated hematocrit    Thrombocytopenia (HCC)       Past Medical History:   Diagnosis Date    Abnormal ultrasound 7/10/2018    Last Assessment & Plan:  Pt found to have a fatty liver and mildly enlarged spleen. Will proceed  with bloodwork. Acute epigastric pain 7/10/2018    Last Assessment & Plan:  Stable. Proceed with EGD to assess for any significant upper GI mucosal  lesions. , Will be evaluating for any esophagitis, gastritis, duodenitis,  H. Pylori, and PUD. Bloating 7/10/2018    Added automatically from request for surgery [id number]     Depression     well controlled with med    Flu-like symptoms 12/24/2017    Foreign body in upper extremity     heroine needle embedded in arm    IV drug abuse ([facility]     heroine    Nausea and vomiting 7/10/2018    Last Assessment & Plan:  Improved. Proceed with EGD     Opiate dependence ([facility]     Pilonidal abscess 2/16/2018    Overview:  Added automatically from request for surgery [id number]        History reviewed. No pertinent surgical history. Family History   Problem Relation Age of Onset    Diabetes Mother          ALLERGY  No Known Allergies        Current Outpatient Medications on File Prior to Visit   Medication Sig Dispense Refill    buprenorphine (SUBUTEX) 8 MG SUBL SL tablet Place 8 mg under the tongue daily. diclofenac (CATAFLAM) 50 MG tablet Take 1 tablet by mouth 2 times daily as needed for Pain 30 tablet 0    nicotine (NICODERM CQ) 14 MG/24HR Place 1 patch onto the skin daily 30 patch 3     No current facility-administered medications on file prior to visit. Review of Systems  Review of Systems   Constitutional:  Negative for chills, fatigue and fever. HENT:  Negative for congestion, postnasal drip, rhinorrhea, sinus pain, sneezing and sore throat. Eyes:  Negative for pain and visual disturbance. Respiratory:  Negative for cough and shortness of breath. Cardiovascular:  Negative for chest pain, palpitations and leg swelling. Gastrointestinal:  Negative for abdominal pain, constipation, diarrhea, nausea and vomiting. Genitourinary:  Negative for dysuria, frequency and urgency. Musculoskeletal:  Positive for arthralgias (R foot pain with swelling). Negative for back pain, gait problem and joint swelling. Skin:  Negative for rash and wound. Neurological:  Negative for dizziness and headaches. Psychiatric/Behavioral:  Negative for behavioral problems, sleep disturbance and suicidal ideas. The patient is not nervous/anxious. Vitals:    03/13/23 1147   BP: 114/65   Site: Right Upper Arm   Position: Sitting   Cuff Size: Medium Adult   Pulse: 92   Temp: 98.3 °F (36.8 °C)   TempSrc: Temporal   SpO2: 96%   Weight: 216 lb 6.4 oz (98.2 kg)   Height: 5' 6\" (1.676 m)              Physical Exam  Physical Exam  Constitutional:       Appearance: He is obese. Neurological:      Mental Status: He is alert and oriented to person, place, and time. Assessment/Plan:          ICD-10-CM    1. Right foot pain  M79.671                Devi Park was seen today for foot pain. Diagnoses and all orders for this visit:    Right foot pain      Given pt's history and current pain, pt's father agreeable to get further evaluation and treatment at the ER. Father plans to take pt to [facility]. No orders of the defined types were placed in this encounter. Follow Up  Return if symptoms worsen or fail to improve, for Follow up with PCP Dr. Derrek Desai.              Jc Owen, JULIA, FNP-BC

## 2023-03-13 NOTE — TELEPHONE ENCOUNTER
The pt called at [phone] stating he was going to be late for his 1120 appt. I notified the pt, if he was going to be more than 10 mins late he would need to r/s or proceed to Urgent Care. (I also spoke to the pt at 1005 this a.m. to ensure he knew he was seeing Diane Hernandes NP, as the last time he saw her he thought he was seeing Dr. Rossana Gusman). The pt stated he had to be seen today. I suggested, if he was going to be more than 10mins late for the appt, that he proceed to Urgent Care. The pt verbalized understanding.

## 2023-03-22 ENCOUNTER — OFFICE VISIT (OUTPATIENT)
Dept: INTERNAL MEDICINE CLINIC | Facility: CLINIC | Age: 33
End: 2023-03-22
Payer: COMMERCIAL

## 2023-03-22 ENCOUNTER — HOSPITAL ENCOUNTER (EMERGENCY)
Age: 33
Discharge: HOME | End: 2023-03-22
Attending: EMERGENCY MEDICINE
Payer: COMMERCIAL

## 2023-03-22 ENCOUNTER — APPOINTMENT (OUTPATIENT)
Dept: ULTRASOUND IMAGING | Age: 33
End: 2023-03-22
Payer: COMMERCIAL

## 2023-03-22 ENCOUNTER — APPOINTMENT (OUTPATIENT)
Dept: GENERAL RADIOLOGY | Age: 33
End: 2023-03-22
Payer: COMMERCIAL

## 2023-03-22 VITALS
WEIGHT: 203 LBS | BODY MASS INDEX: 32.62 KG/M2 | HEART RATE: 88 BPM | DIASTOLIC BLOOD PRESSURE: 66 MMHG | RESPIRATION RATE: 14 BRPM | HEIGHT: 66 IN | SYSTOLIC BLOOD PRESSURE: 113 MMHG | OXYGEN SATURATION: 100 % | TEMPERATURE: 98.2 F

## 2023-03-22 VITALS
TEMPERATURE: 98.9 F | RESPIRATION RATE: 18 BRPM | HEART RATE: 90 BPM | DIASTOLIC BLOOD PRESSURE: 90 MMHG | SYSTOLIC BLOOD PRESSURE: 118 MMHG | HEIGHT: 66 IN | WEIGHT: 203 LBS | OXYGEN SATURATION: 99 % | BODY MASS INDEX: 32.62 KG/M2

## 2023-03-22 DIAGNOSIS — M79.671 RIGHT FOOT PAIN: Primary | ICD-10-CM

## 2023-03-22 DIAGNOSIS — S92.324A CLOSED NONDISPLACED FRACTURE OF SECOND METATARSAL BONE OF RIGHT FOOT, INITIAL ENCOUNTER: Primary | ICD-10-CM

## 2023-03-22 DIAGNOSIS — F11.10 HEROIN ABUSE (HCC): ICD-10-CM

## 2023-03-22 DIAGNOSIS — D69.6 THROMBOCYTOPENIA (HCC): ICD-10-CM

## 2023-03-22 PROCEDURE — 99284 EMERGENCY DEPT VISIT MOD MDM: CPT

## 2023-03-22 PROCEDURE — 99215 OFFICE O/P EST HI 40 MIN: CPT | Performed by: INTERNAL MEDICINE

## 2023-03-22 PROCEDURE — 73630 X-RAY EXAM OF FOOT: CPT

## 2023-03-22 PROCEDURE — 93971 EXTREMITY STUDY: CPT

## 2023-03-22 SDOH — ECONOMIC STABILITY: HOUSING INSECURITY
IN THE LAST 12 MONTHS, WAS THERE A TIME WHEN YOU DID NOT HAVE A STEADY PLACE TO SLEEP OR SLEPT IN A SHELTER (INCLUDING NOW)?: NO

## 2023-03-22 SDOH — ECONOMIC STABILITY: INCOME INSECURITY: HOW HARD IS IT FOR YOU TO PAY FOR THE VERY BASICS LIKE FOOD, HOUSING, MEDICAL CARE, AND HEATING?: NOT HARD AT ALL

## 2023-03-22 SDOH — ECONOMIC STABILITY: FOOD INSECURITY: WITHIN THE PAST 12 MONTHS, YOU WORRIED THAT YOUR FOOD WOULD RUN OUT BEFORE YOU GOT MONEY TO BUY MORE.: NEVER TRUE

## 2023-03-22 SDOH — ECONOMIC STABILITY: FOOD INSECURITY: WITHIN THE PAST 12 MONTHS, THE FOOD YOU BOUGHT JUST DIDN'T LAST AND YOU DIDN'T HAVE MONEY TO GET MORE.: NEVER TRUE

## 2023-03-22 ASSESSMENT — PAIN - FUNCTIONAL ASSESSMENT: PAIN_FUNCTIONAL_ASSESSMENT: NONE - DENIES PAIN

## 2023-03-22 ASSESSMENT — ENCOUNTER SYMPTOMS
NAUSEA: 0
SHORTNESS OF BREATH: 0
DIARRHEA: 0
VOMITING: 0
EYE REDNESS: 0
COUGH: 0
SORE THROAT: 0
ABDOMINAL PAIN: 0
BACK PAIN: 0

## 2023-03-22 ASSESSMENT — PATIENT HEALTH QUESTIONNAIRE - PHQ9
1. LITTLE INTEREST OR PLEASURE IN DOING THINGS: 0
SUM OF ALL RESPONSES TO PHQ QUESTIONS 1-9: 0
SUM OF ALL RESPONSES TO PHQ QUESTIONS 1-9: 0
SUM OF ALL RESPONSES TO PHQ9 QUESTIONS 1 & 2: 0
SUM OF ALL RESPONSES TO PHQ QUESTIONS 1-9: 0
SUM OF ALL RESPONSES TO PHQ QUESTIONS 1-9: 0
2. FEELING DOWN, DEPRESSED OR HOPELESS: 0

## 2023-03-22 ASSESSMENT — LIFESTYLE VARIABLES
HOW OFTEN DO YOU HAVE A DRINK CONTAINING ALCOHOL: NEVER
HOW MANY STANDARD DRINKS CONTAINING ALCOHOL DO YOU HAVE ON A TYPICAL DAY: PATIENT DOES NOT DRINK

## 2023-03-22 ASSESSMENT — ANXIETY QUESTIONNAIRES
5. BEING SO RESTLESS THAT IT IS HARD TO SIT STILL: 0
7. FEELING AFRAID AS IF SOMETHING AWFUL MIGHT HAPPEN: 0
1. FEELING NERVOUS, ANXIOUS, OR ON EDGE: 0
6. BECOMING EASILY ANNOYED OR IRRITABLE: 0
IF YOU CHECKED OFF ANY PROBLEMS ON THIS QUESTIONNAIRE, HOW DIFFICULT HAVE THESE PROBLEMS MADE IT FOR YOU TO DO YOUR WORK, TAKE CARE OF THINGS AT HOME, OR GET ALONG WITH OTHER PEOPLE: NOT DIFFICULT AT ALL
GAD7 TOTAL SCORE: 0
4. TROUBLE RELAXING: 0
3. WORRYING TOO MUCH ABOUT DIFFERENT THINGS: 0
2. NOT BEING ABLE TO STOP OR CONTROL WORRYING: 0

## 2023-03-22 NOTE — ED TRIAGE NOTES
Patient arrives to ED pov from home. Patient reports right foot swelling and redness x 3 days. Denies fever or chills. Denies trauma to foot. Denies drainage.

## 2023-03-22 NOTE — PROGRESS NOTES
No rales. No rhonci. Musculoskeletal: Bilateral feet swollen and mildly red. Nontender to touch. Skin is tight/taut. Psychiatric: Normal thought content. Normal behavior. Normal judgment. Recommendations     Assessment:  Patient Active Problem List   Diagnosis    Class 1 obesity without serious comorbidity with body mass index (BMI) of 30.0 to 30.9 in adult    Tobacco abuse    Heroin abuse (HCC)    Mild amphetamine-type substance abuse (HCC)    Elevated hemoglobin (HCC)    Elevated hematocrit    Thrombocytopenia (HCC)      Plan:  Patient's bilateral feet and hands are swollen and slightly red. His right foot is very painful when he stands on it that he cannot even bear weight. He presented in a wheelchair today. This has been going on since March 6. With his history of IV drug abuse and positive SAYDA there was concern in the past on March 13, 2023 that he may have cellulitis or some other type of infection versus an autoimmune/inflammatory condition. He left the ER at [facility] because they wanted to admit him but he did not want to be admitted. Due to his increased pain and ongoing pain I recommend that he go back to the ER at [facility] and be admitted for possible IV antibiotics, MRI, repeat x-ray, Ortho/rheumatology consultation with outpatient follow-up and other labs to determine what is going on his feet and his hands. Patient and his mother and father were present with him today both agree with this plan and will take him down to [facility]. I have called Dr. Janora Castleman to alert him to their arrival and briefed him on the case. -Previous medical records and/or labs/tests available to me reviewed, any records outstanding not available requested  -The risks, benefits, and medical necessity of all medications, tests labs, and any other orders that were ordered at today's visit were discussed with the patient including all elective or patient requested orders.

## 2023-03-22 NOTE — ED NOTES
I have reviewed discharge instructions with the patient. The patient verbalized understanding. Patient left ED via Discharge Method: wheelchair to Home with self. Opportunity for questions and clarification provided. Patient given 0 scripts. To continue your aftercare when you leave the hospital, you may receive an automated call from our care team to check in on how you are doing. This is a free service and part of our promise to provide the best care and service to meet your aftercare needs.  If you have questions, or wish to unsubscribe from this service please call [phone]. Thank you for Choosing our [facility] Emergency Department.        Katarina Groves RN  03/22/23 1950

## 2023-03-22 NOTE — ED PROVIDER NOTES
Patient left to me by Carlos Alberto Childs. He has a right foot metatarsal second fracture. Nondisplaced. Appears to be healing on x-ray. Likely cause of his foot pain. Will place in a splint and crutches and follow-up with Ortho.      Bela Elkins III, MD  03/22/23 0097
Thank you for the referral of this patient. This exam was interpreted by an   American Board of Radiology certified radiologist with subspecialty fellowship   in body imaging. If there are any questions regarding this exam please feel free   to contact a radiologist directly at [phone]. Slot 15     Curvin Tionesta   3/22/2023 7:07:00 PM        Vascular duplex lower extremity venous right   Final Result      1. No evidence of right lower extremity deep venous thrombosis. Thank you for the referral of this patient. This exam was interpreted by an    American Board of Radiology certified radiologist with subspecialty fellowship    in body imaging. If there are any questions regarding this exam please feel free    to contact a radiologist directly at [phone]. Slot 15       Curvin Tionesta    3/22/2023 7:07:00 PM      XR FOOT RIGHT (MIN 3 VIEWS)   Final Result   Impression:      Healing fracture second metatarsal.      Slot 22. Chris Morrison M.D.    3/22/2023 5:56:00 PM                        Voice dictation software was used during the making of this note. This software is not perfect and grammatical and other typographical errors may be present. This note has not been completely proofread for errors.      RANDA, [id number]  03/24/23 2916

## 2023-03-24 ENCOUNTER — OFFICE VISIT (OUTPATIENT)
Dept: ORTHOPEDIC SURGERY | Age: 33
End: 2023-03-24

## 2023-03-24 DIAGNOSIS — S92.324A CLOSED NONDISPLACED FRACTURE OF SECOND METATARSAL BONE OF RIGHT FOOT, INITIAL ENCOUNTER: Primary | ICD-10-CM

## 2023-03-24 NOTE — PROGRESS NOTES
Name: Leonardo Silverio  YOB: 1990  Gender: male  MRN: [id number]    Summary:   Right second metatarsal fracture       CC: No chief complaint on file. HPI: Leonardo Silverio is a 27 y.o. male who presents with No chief complaint on file. .  This patient presents to the office today with an unknown trauma to his right foot. He was seen in the emergency room on 2 separate occasions and I reviewed the outside medical records and x-rays from this and diagnosed with a second metatarsal fracture. He presents to the office today in a splint. History was obtained by Patient his mother    ROS/Meds/PSH/PMH/FH/SH: I personally reviewed the patients standard intake form. Below are the pertinents    Tobacco:  reports that he has been smoking cigarettes. He has been smoking an average of .25 packs per day. He has never used smokeless tobacco.  Diabetes: None      Physical Examination:    Exam of the right foot shows some swelling consistent with a trauma such as dropping some of the foot. There is no sign of abscess or infection noted. Imaging:   I independently interpreted XR ordered by a different physician, taken from an outside facility of the right foot which shows a healing second metatarsal fracture           Crystal Dorado III, MD           Assessment:   Right foot second metatarsal fracture    Treatment Plan:   4 This is a chronic illness/condition with exacerbation and progression  Treatment at this time: Bracing: Placed in: 3D boot  Studies ordered: Foot XR needed @ Next Visit    Weight-bearing status: WBAT        Return to work/work restrictions: none  No medications given    He is placed into a walker boot today weightbearing as tolerated return in 6 weeks for an x-ray.
The patient was prescribed a walker boot for the patient's right foot. The patient wears a size 9.5 shoe and I fitted them with a M size boot. The patient was fitted and instructed on the use of prescribed walker boot. I explained how to fit themselves and that the plastic flexible piece should always be on the front of the boot and secured by the Velcro straps on top. The air bladder in the boot was adjusted according to proper fit and comfort. The patient walked a short distance and acknowledged satisfaction with current fit. I also explained that they need a heel lift or a higher heeled shoe for the uninvolved LE to help normalize gait and avoid excessive low back stress/strain due to leg length inequality created from walker boot. Patient read and signed documenting they understand and agree to [facility]'s current DME return policy.
No cyanosis, clubbing or edema

## 2023-05-05 ENCOUNTER — OFFICE VISIT (OUTPATIENT)
Dept: ORTHOPEDIC SURGERY | Age: 33
End: 2023-05-05

## 2023-05-05 DIAGNOSIS — S92.324A CLOSED NONDISPLACED FRACTURE OF SECOND METATARSAL BONE OF RIGHT FOOT, INITIAL ENCOUNTER: Primary | ICD-10-CM

## 2023-05-05 NOTE — PROGRESS NOTES
Patient was fitted and instructed on a Carbon Fiber Insert for the right foot. Patient read and signed documenting they understand and agree to [facility]'s current DME return policy.
this.  If is not significantly better I encouraged him to call back and return down the road if necessary.

## 2024-11-18 NOTE — PROCEDURE: SHAVE REMOVAL
Anesthesia Volume In Cc: 0.5
Post-Care Instructions: I reviewed with the patient in detail post-care instructions. Patient is to keep the biopsy site dry overnight, and then apply bacitracin twice daily until healed. Patient may apply hydrogen peroxide soaks to remove any crusting.  After the procedure, the patient was observed for 5-10 minutes and was oriented to,person, place and time and denied feeling dizzy, queasy and stated that they were not going to faint
Path Notes (To The Dermatopathologist): Please check margins.
Size Of Margin In Cm (Margins Are Not Added To Billing Dimensions): -
Biopsy Method: Dermablade
Detail Level: Detailed
Medical Necessity Information: It is in your best interest to select a reason for this procedure from the list below. All of these items fulfill various CMS LCD requirements except the new and changing color options.
Bill 73916 For Specimen Handling/Conveyance To Laboratory?: no
Render Post-Care Instructions In Note?: yes
Billing Type: Third-Party Bill
X Size Of Lesion In Cm (Optional): 0
Size Of Lesion In Cm (Required): 0.6
Wound Care: Vaseline
Hemostasis: Electrocautery
Accession #: [id number].
Notification Instructions: Patient will be notified of biopsy results. However, patient instructed to call the office if not contacted within 2 weeks.
Medical Necessity Clause: This procedure was medically necessary because the lesion that was treated was:enlarging, rubbed by clothing
Consent was obtained from the patient. The risks and benefits to therapy were discussed in detail. Specifically, the risks of infection, scarring, bleeding, prolonged wound healing, incomplete removal, allergy to anesthesia, nerve injury and recurrence were addressed. Prior to the procedure, the treatment site was clearly identified and confirmed by the patient. All components of Universal Protocol/PAUSE Rule completed.
Anesthesia Type: 1% lidocaine without epinephrine
18-Nov-2024 18:11